# Patient Record
Sex: MALE | Race: BLACK OR AFRICAN AMERICAN | Employment: FULL TIME | URBAN - METROPOLITAN AREA
[De-identification: names, ages, dates, MRNs, and addresses within clinical notes are randomized per-mention and may not be internally consistent; named-entity substitution may affect disease eponyms.]

---

## 2018-10-30 ENCOUNTER — ED HISTORICAL/CONVERTED ENCOUNTER (OUTPATIENT)
Dept: OTHER | Age: 53
End: 2018-10-30

## 2020-11-16 ENCOUNTER — OFFICE VISIT (OUTPATIENT)
Dept: INTERNAL MEDICINE CLINIC | Age: 55
End: 2020-11-16
Payer: COMMERCIAL

## 2020-11-16 VITALS
TEMPERATURE: 98 F | DIASTOLIC BLOOD PRESSURE: 88 MMHG | SYSTOLIC BLOOD PRESSURE: 172 MMHG | RESPIRATION RATE: 18 BRPM | WEIGHT: 248.8 LBS | HEIGHT: 73 IN | HEART RATE: 84 BPM | BODY MASS INDEX: 32.97 KG/M2 | OXYGEN SATURATION: 98 %

## 2020-11-16 DIAGNOSIS — Z12.5 SCREENING FOR MALIGNANT NEOPLASM OF PROSTATE: ICD-10-CM

## 2020-11-16 DIAGNOSIS — N52.9 IMPOTENCE: ICD-10-CM

## 2020-11-16 DIAGNOSIS — I10 ESSENTIAL HYPERTENSION: Primary | ICD-10-CM

## 2020-11-16 PROBLEM — M77.10 LATERAL EPICONDYLITIS: Status: ACTIVE | Noted: 2020-11-16

## 2020-11-16 PROBLEM — M79.603 PAIN IN UPPER LIMB: Status: ACTIVE | Noted: 2020-11-16

## 2020-11-16 PROBLEM — I80.9 SUPERFICIAL THROMBOPHLEBITIS: Status: ACTIVE | Noted: 2020-11-16

## 2020-11-16 PROBLEM — J06.9 URI (UPPER RESPIRATORY INFECTION): Status: ACTIVE | Noted: 2020-11-16

## 2020-11-16 PROBLEM — R53.83 FATIGUE: Status: ACTIVE | Noted: 2020-11-16

## 2020-11-16 PROBLEM — N52.1 ERECTILE DYSFUNCTION DUE TO DISEASES CLASSIFIED ELSEWHERE: Status: ACTIVE | Noted: 2020-11-16

## 2020-11-16 PROBLEM — G47.00 INSOMNIA: Status: ACTIVE | Noted: 2020-11-16

## 2020-11-16 PROBLEM — M25.569 KNEE PAIN: Status: ACTIVE | Noted: 2020-11-16

## 2020-11-16 PROBLEM — I67.89 ACUTE ILL-DEFINED CEREBROVASCULAR DISEASE: Status: ACTIVE | Noted: 2020-11-16

## 2020-11-16 PROBLEM — R03.0 ELEVATED BLOOD-PRESSURE READING WITHOUT DIAGNOSIS OF HYPERTENSION: Status: ACTIVE | Noted: 2020-11-16

## 2020-11-16 PROBLEM — R05.9 COUGH: Status: ACTIVE | Noted: 2020-11-16

## 2020-11-16 PROCEDURE — 99214 OFFICE O/P EST MOD 30 MIN: CPT | Performed by: INTERNAL MEDICINE

## 2020-11-16 RX ORDER — AMLODIPINE BESYLATE 5 MG/1
1 TABLET ORAL DAILY
COMMUNITY
Start: 2020-10-17 | End: 2021-06-03 | Stop reason: SDUPTHER

## 2020-11-16 NOTE — PROGRESS NOTES
Kalia Villagomez is a 48 y.o. male and presents with Follow-up and Hypertension  Patient presents for follow-up we reviewed last labs of the year ago October 31 showing glucose 104 BUN 10 creatinine 1.45 potassium 4.4 sodium 142 normal hepatocellular enzymes white count 4600 hemoglobin 16.4 PSA 0.27 TSH 1.3-. 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 History of elevated blood pressure without the diagnosis of hypertension but today after resting and using a large cuff blood pressure 559 systolic we discussed diet exercise he was agreeable drawing blood for those studies below we discussed preventive care-he has some symptoms of rectal dysfunction and wanted testosterone checked checked           Review of Systems  Constitutional: negative for fevers, chills, anorexia, weight loss and fatigue,no insomnia  Eyes:   negative for visual disturbance and irritation, eye discharge, eye pain. no eye redness. ENT:   negative for tinnitus, sore throat, nasal congestion, ear pain, hoarseness, hearing loss.,no snoring. Respiratory:  negative for cough, hemoptysis, shortness of breath, wheezing,  CV:   negative for chest pain, palpitations, lower extremity edema, shortness of breath while sitting, walking or at night  GI:   negative for nausea, vomiting, diarrhea, abdominal pain,melena,constipation. Endo:               negative for polyuria, polydipsia, polyphagia, cold or heat intolerance,hair loss. Genitourinary: negative for frequency, dysuria and hematuria,urethral-erectile dysfunction history discharge,nocturia.straining while urination,urinary incontinence. Integument:  negative for rash and pruritus  Hematologic:  negative for easy bruising and gum/nose bleeding, enlarged nodes  Musculoskel: negative for myalgias, arthralgias, back pain, muscle weakness, joint pain, h/o fall,cramps,calf pain.   Neurological:  negative for headaches, dizziness, vertigo, memory problems, gait and seizures loss of consciousness,no ataxia. Behavl/Psych: negative for feelings of anxiety, depression, mood changes ,sadness    Past Medical History:   Diagnosis Date    Acute ill-defined cerebrovascular disease 11/16/2020    Cough 11/16/2020    Elevated blood-pressure reading without diagnosis of hypertension 11/16/2020    Erectile dysfunction due to diseases classified elsewhere 11/16/2020    Essential hypertension 11/16/2020    Fatigue 11/16/2020    Insomnia 11/16/2020    Knee pain 11/16/2020    Lateral epicondylitis 11/16/2020    Pain in upper limb 11/16/2020    Superficial thrombophlebitis 11/16/2020    URI (upper respiratory infection) 11/16/2020     No past surgical history on file. Social History     Socioeconomic History    Marital status:      Spouse name: Not on file    Number of children: Not on file    Years of education: Not on file    Highest education level: Not on file   Tobacco Use    Smoking status: Current Some Day Smoker     Years: 10.00     Types: Cigars    Smokeless tobacco: Never Used    Tobacco comment: 4 cigars a day   Substance and Sexual Activity    Alcohol use: Yes     Comment: occasional     Family History   Problem Relation Age of Onset    Elevated Lipids Mother     Hypertension Mother     Hypertension Father      Current Outpatient Medications   Medication Sig Dispense Refill    amLODIPine (NORVASC) 5 mg tablet Take 1 Tab by mouth daily. No Known Allergies    Objective:  Visit Vitals  BP (!) 172/88 (BP 1 Location: Right arm, BP Patient Position: Sitting)   Pulse 84   Temp 98 °F (36.7 °C) (Oral)   Resp 18   Ht 6' 1\" (1.854 m)   Wt 248 lb 12.8 oz (112.9 kg)   SpO2 98%   BMI 32.83 kg/m²       Physical Exam:   Constitutional: General Appearance: healthy-appearing and obese. Level of Distress: NAD. Ambulation: ambulating normally. Psychiatric: Mental Status: normal mood and affect and active and alert.  Orientation: to time, place, and person. no agitation. ,normal eye contact. normal insight  Head: Head: normocephalic and atraumatic. Eyes: Pupils: PERRLA. Sclerae: non-icteric. ENMT: No lesions on external ear, no hearing loss. No lesions on external nose, sinus tenderness, or nasal discharge. Lips, Teeth, and no mouth or lip ulcers   Neck: Neck: supple, trachea midline, and no masses. Lymph Nodes: no cervical LAD. Thyroid: no enlargement or nodules and non-tender. Lungs: Respiratory effort: no dyspnea. Auscultation: no wheezing, rales/crackles, or rhonchi and breath sounds normal and good air movement. Cardiovascular: Apical Impulse: not displaced. Heart Auscultation: normal S1 and S2; no murmurs, rubs, or gallops; and RRR. Neck vessels: no carotid bruits. Pulses including femoral / pedal: normal throughout. Abdomen: Bowel Sounds: normal. Inspection and Palpation: no tenderness, guarding, or masses and soft and non-distended. Liver: non-tender and no hepatomegaly. Spleen: non-tender and no splenomegaly.:  Musculoskeletal[de-identified] Extremities: no edema or varicosities. Calf tenderness. Neurologic: Gait and Station: normal gait and station. Motor Strength normal right and left. Sensory and cerebellar intact. Skin: Inspection and palpation: no rash, lesions, or ulcer. No results found for this or any previous visit. Assessment/Plan:    ICD-10-CM ICD-9-CM    1. Essential hypertension  I10 401.9 CBC WITH AUTOMATED DIFF      METABOLIC PANEL, COMPREHENSIVE      CHOLESTEROL, TOTAL      TSH 3RD GENERATION   2. Impotence  N52.9 607.84 TESTOSTERONE, FREE & TOTAL   3. Screening for malignant neoplasm of prostate  Z12.5 V76.44 PSA W/ REFLX FREE PSA     Orders Placed This Encounter    PSA W/ REFLX FREE PSA    CBC WITH AUTOMATED DIFF    METABOLIC PANEL, COMPREHENSIVE    CHOLESTEROL, TOTAL    TSH 3RD GENERATION    TESTOSTERONE, FREE & TOTAL    amLODIPine (NORVASC) 5 mg tablet     Sig: Take 1 Tab by mouth daily.        routine labs ordered, call if any problems    There are no Patient Instructions on file for this visit. Follow-up and Dispositions    · Return in about 6 months (around 5/16/2021).

## 2020-11-18 LAB
ALBUMIN SERPL-MCNC: 4.1 G/DL (ref 3.8–4.9)
ALBUMIN/GLOB SERPL: 1.3 {RATIO} (ref 1.2–2.2)
ALP SERPL-CCNC: 70 IU/L (ref 39–117)
ALT SERPL-CCNC: 25 IU/L (ref 0–44)
AST SERPL-CCNC: 19 IU/L (ref 0–40)
BASOPHILS # BLD AUTO: 0 X10E3/UL (ref 0–0.2)
BASOPHILS NFR BLD AUTO: 1 %
BILIRUB SERPL-MCNC: 0.9 MG/DL (ref 0–1.2)
BUN SERPL-MCNC: 17 MG/DL (ref 6–24)
BUN/CREAT SERPL: 12 (ref 9–20)
CALCIUM SERPL-MCNC: 9.1 MG/DL (ref 8.7–10.2)
CHLORIDE SERPL-SCNC: 102 MMOL/L (ref 96–106)
CHOLEST SERPL-MCNC: 216 MG/DL (ref 100–199)
CO2 SERPL-SCNC: 25 MMOL/L (ref 20–29)
CREAT SERPL-MCNC: 1.46 MG/DL (ref 0.76–1.27)
EOSINOPHIL # BLD AUTO: 0.1 X10E3/UL (ref 0–0.4)
EOSINOPHIL NFR BLD AUTO: 2 %
ERYTHROCYTE [DISTWIDTH] IN BLOOD BY AUTOMATED COUNT: 12.8 % (ref 11.6–15.4)
GLOBULIN SER CALC-MCNC: 3.1 G/DL (ref 1.5–4.5)
GLUCOSE SERPL-MCNC: 118 MG/DL (ref 65–99)
HCT VFR BLD AUTO: 48.9 % (ref 37.5–51)
HGB BLD-MCNC: 16.6 G/DL (ref 13–17.7)
IMM GRANULOCYTES # BLD AUTO: 0 X10E3/UL (ref 0–0.1)
IMM GRANULOCYTES NFR BLD AUTO: 0 %
LYMPHOCYTES # BLD AUTO: 1.6 X10E3/UL (ref 0.7–3.1)
LYMPHOCYTES NFR BLD AUTO: 27 %
MCH RBC QN AUTO: 30.8 PG (ref 26.6–33)
MCHC RBC AUTO-ENTMCNC: 33.9 G/DL (ref 31.5–35.7)
MCV RBC AUTO: 91 FL (ref 79–97)
MONOCYTES # BLD AUTO: 0.4 X10E3/UL (ref 0.1–0.9)
MONOCYTES NFR BLD AUTO: 7 %
NEUTROPHILS # BLD AUTO: 3.8 X10E3/UL (ref 1.4–7)
NEUTROPHILS NFR BLD AUTO: 63 %
PLATELET # BLD AUTO: 151 X10E3/UL (ref 150–450)
POTASSIUM SERPL-SCNC: 4.5 MMOL/L (ref 3.5–5.2)
PROT SERPL-MCNC: 7.2 G/DL (ref 6–8.5)
PSA SERPL-MCNC: 0.3 NG/ML (ref 0–4)
RBC # BLD AUTO: 5.39 X10E6/UL (ref 4.14–5.8)
REFLEX CRITERIA: NORMAL
SODIUM SERPL-SCNC: 140 MMOL/L (ref 134–144)
TESTOST FREE SERPL-MCNC: 8 PG/ML (ref 7.2–24)
TESTOST SERPL-MCNC: 319 NG/DL (ref 264–916)
TSH SERPL DL<=0.005 MIU/L-ACNC: 1.03 UIU/ML (ref 0.45–4.5)
WBC # BLD AUTO: 5.9 X10E3/UL (ref 3.4–10.8)

## 2020-12-02 ENCOUNTER — TELEPHONE (OUTPATIENT)
Dept: INTERNAL MEDICINE CLINIC | Age: 55
End: 2020-12-02

## 2020-12-02 RX ORDER — AMLODIPINE BESYLATE 5 MG/1
5 TABLET ORAL DAILY
Qty: 90 TAB | Refills: 1 | Status: SHIPPED | OUTPATIENT
Start: 2020-12-02 | End: 2021-05-31

## 2020-12-02 NOTE — TELEPHONE ENCOUNTER
Patient called looking for his Amodipine 5mg please send to [facility]. I looks like it was put in but not sent.

## 2021-04-26 ENCOUNTER — OFFICE VISIT (OUTPATIENT)
Dept: INTERNAL MEDICINE CLINIC | Age: 56
End: 2021-04-26
Payer: COMMERCIAL

## 2021-04-26 VITALS
WEIGHT: 256 LBS | HEIGHT: 74 IN | TEMPERATURE: 97.8 F | DIASTOLIC BLOOD PRESSURE: 72 MMHG | RESPIRATION RATE: 18 BRPM | OXYGEN SATURATION: 99 % | SYSTOLIC BLOOD PRESSURE: 132 MMHG | BODY MASS INDEX: 32.85 KG/M2 | HEART RATE: 86 BPM

## 2021-04-26 DIAGNOSIS — F51.01 PRIMARY INSOMNIA: ICD-10-CM

## 2021-04-26 DIAGNOSIS — R73.9 HYPERGLYCEMIA: ICD-10-CM

## 2021-04-26 DIAGNOSIS — I10 ESSENTIAL HYPERTENSION: Primary | ICD-10-CM

## 2021-04-26 DIAGNOSIS — E78.00 PURE HYPERCHOLESTEROLEMIA: ICD-10-CM

## 2021-04-26 PROBLEM — S49.91XA INJURY OF RIGHT SHOULDER AND UPPER ARM: Status: ACTIVE | Noted: 2021-04-26

## 2021-04-26 LAB — GLUCOSE POC: 99 MG/DL

## 2021-04-26 PROCEDURE — 82962 GLUCOSE BLOOD TEST: CPT | Performed by: INTERNAL MEDICINE

## 2021-04-26 PROCEDURE — 99214 OFFICE O/P EST MOD 30 MIN: CPT | Performed by: INTERNAL MEDICINE

## 2021-04-26 RX ORDER — TEMAZEPAM 30 MG/1
30 CAPSULE ORAL
Qty: 30 CAP | Refills: 0 | Status: SHIPPED | OUTPATIENT
Start: 2021-04-26 | End: 2021-05-26

## 2021-04-26 NOTE — PROGRESS NOTES
1. Have you been to the ER, urgent care clinic since your last visit? Hospitalized since your last visit? No    2. Have you seen or consulted any other health care providers outside of the [address] [place] [address] since your last visit? Include any pap smears or colon screening.  No     Chief Complaint   Patient presents with    Follow Up Chronic Condition    Hypertension     Visit Vitals  /72 (BP 1 Location: Left arm, BP Patient Position: Sitting, BP Cuff Size: Adult)   Pulse 86   Temp 97.8 °F (36.6 °C) (Oral)   Resp 18   Ht 6' 2\" (1.88 m)   Wt 256 lb (116.1 kg)   SpO2 99%   BMI 32.87 kg/m²

## 2021-04-26 NOTE — PROGRESS NOTES
Torin De Paz is a 55 y.o. male and presents with Follow Up Chronic Condition and Hypertension  Patient presents concerned about some bruising on the lateral aspect of the upper right dominant arm he says he was picking up some thing heavy and seem to jerk his arm up and few days later noticed a bruise and some soreness he had good capillary refill of the nailbeds on the right hand and normal Victor Hugo test no loss of strength good  good interosseous muscle strength and abduction of the arm at the shoulder he was concerned about the possibility of a stroke since it happened in the past but this seemed to be more musculoskeletal he had a bruising in the small area of the upper anterior arm which is more related to injury he says he is putting an over-the-counter topical salve on it is is better he did not want imaging studies or prescription medication he waited out another few days a week and let me know if he is continues to slowly improve he has an appointment next month nonetheless and will keep that we discussed his last labs showed a glucose of 118 creatinine 1.46 but he has quite a muscular yaneth and does exercise often potassium 4.5 calcium 9.1 SGOT of 19 cholesterol 216 TSH 1.03 testosterone 319 we discussed low testosterone risks and benefits of therapy use of phosphodiesterase inhibitors he wanted to hold off on testosterone replacement or recheck of testosterone blood pressure resting 128/86 left arm large cuff he wanted refill of his Restoril as it helps some insomnia takes it only on occasion blood sugar today 99 he has received the [place] vaccine x2 and we discussed coronavirus pandemic evaluation treatment and vaccine           Review of Systems  Constitutional: negative for fevers, chills, anorexia, weight loss and fatigue,no insomnia  Eyes:   negative for visual disturbance and irritation, eye discharge, eye pain. no eye redness.   ENT:   negative for tinnitus, sore throat, nasal congestion, ear pain, hoarseness, hearing loss.,no snoring. Respiratory:  negative for cough, hemoptysis, shortness of breath, wheezing,  CV:   negative for chest pain, palpitations, lower extremity edema, shortness of breath while sitting, walking or at night  GI:   negative for nausea, vomiting, diarrhea, abdominal pain,melena,constipation. Endo:               negative for polyuria, polydipsia, polyphagia, cold or heat intolerance,hair loss. Genitourinary: negative for frequency, dysuria and hematuria,urethral discharge,nocturia.straining while urination,urinary incontinence. Integument:  negative for rash and pruritus  Hematologic:  negative for easy bruising and gum/nose bleeding, enlarged nodes  Musculoskel: negative for myalgias, arthralgias, back pain, muscle weakness, joint pain, h/o fall,cramps,calf pain. Neurological:  negative for headaches, dizziness, vertigo, memory problems, gait and seizures loss of consciousness,no ataxia. Behavl/Psych: negative for feelings of anxiety, depression, mood changes ,sadness    Past Medical History:   Diagnosis Date    Acute ill-defined cerebrovascular disease 11/16/2020    Cough 11/16/2020    Elevated blood-pressure reading without diagnosis of hypertension 11/16/2020    Erectile dysfunction due to diseases classified elsewhere 11/16/2020    Essential hypertension 11/16/2020    Fatigue 11/16/2020    Insomnia 11/16/2020    Knee pain 11/16/2020    Lateral epicondylitis 11/16/2020    Pain in upper limb 11/16/2020    Superficial thrombophlebitis 11/16/2020    URI (upper respiratory infection) 11/16/2020     No past surgical history on file.   Social History     Socioeconomic History    Marital status:      Spouse name: Not on file    Number of children: Not on file    Years of education: Not on file    Highest education level: Not on file   Tobacco Use    Smoking status: Current Some Day Smoker     Packs/day: 0.00     Years: 10.00     Pack years: 0.00     Types: Cigars    Smokeless tobacco: Never Used    Tobacco comment: 4 cigars a day   Substance and Sexual Activity    Alcohol use: Yes     Comment: occasional     Family History   Problem Relation Age of Onset    Elevated Lipids Mother     Hypertension Mother     Hypertension Father      Current Outpatient Medications   Medication Sig Dispense Refill    amLODIPine (NORVASC) 5 mg tablet Take 1 Tab by mouth daily for 180 days. 90 Tab 1    amLODIPine (NORVASC) 5 mg tablet Take 1 Tab by mouth daily. No Known Allergies    Objective:  Visit Vitals  /72 (BP 1 Location: Left arm, BP Patient Position: Sitting, BP Cuff Size: Adult)   Pulse 86   Temp 97.8 °F (36.6 °C) (Oral)   Resp 18   Ht 6' 2\" (1.88 m)   Wt 256 lb (116.1 kg)   SpO2 99%   BMI 32.87 kg/m²       Physical Exam:   Constitutional: General Appearance: healthy-appearing and obese. Level of Distress: NAD. Ambulation: ambulating normally. Psychiatric: Mental Status: normal mood and affect and active and alert. Orientation: to time, place, and person. no agitation. ,normal eye contact. normal insight  Head: Head: normocephalic and atraumatic. Eyes: Pupils: PERRLA. Sclerae: non-icteric. ENMT: No lesions on external ear, no hearing loss. No lesions on external nose, sinus tenderness, or nasal discharge. Lips, Teeth, and no mouth or lip ulcers   Neck: Neck: supple, trachea midline, and no masses. Lymph Nodes: no cervical LAD. Thyroid: no enlargement or nodules and non-tender. Lungs: Respiratory effort: no dyspnea. Auscultation: no wheezing, rales/crackles, or rhonchi and breath sounds normal and good air movement. Cardiovascular: Apical Impulse: not displaced. Heart Auscultation: normal S1 and S2; no murmurs, rubs, or gallops; and RRR. Neck vessels: no carotid bruits. Pulses including femoral / pedal: normal throughout. Abdomen: Bowel Sounds: normal. Inspection and Palpation: no tenderness, guarding, or masses and soft and non-distended.  Liver: non-tender  Musculoskeletal[de-identified] Extremities: no edema or varicosities. Calf tenderness. Muscular well-developed  Neurologic: Gait and Station: normal gait and station. Motor Strength normal right and left. Sensory and cerebellar intact. Upper right arm  Skin: Inspection and palpation: no rash, lesions, or ulcer. Bruise level      Results for orders placed or performed in visit on 04/26/21   AMB POC GLUCOSE BLOOD, BY GLUCOSE MONITORING DEVICE   Result Value Ref Range    Glucose POC 99 MG/DL       Assessment/Plan:    ICD-10-CM ICD-9-CM    1. Essential hypertension  I10 401.9    2. Hyperglycemia  R73.9 790.29 AMB POC GLUCOSE BLOOD, BY GLUCOSE MONITORING DEVICE   3. Pure hypercholesterolemia  E78.00 272.0      Orders Placed This Encounter    AMB POC GLUCOSE BLOOD, BY GLUCOSE MONITORING DEVICE       call if any problems    There are no Patient Instructions on file for this visit. Follow-up and Dispositions    · Return in about 4 weeks (around 5/24/2021).

## 2021-06-02 ENCOUNTER — OFFICE VISIT (OUTPATIENT)
Dept: INTERNAL MEDICINE CLINIC | Age: 56
End: 2021-06-02
Payer: COMMERCIAL

## 2021-06-02 VITALS
HEIGHT: 74 IN | TEMPERATURE: 98.1 F | WEIGHT: 256.4 LBS | SYSTOLIC BLOOD PRESSURE: 142 MMHG | HEART RATE: 86 BPM | DIASTOLIC BLOOD PRESSURE: 72 MMHG | OXYGEN SATURATION: 99 % | BODY MASS INDEX: 32.91 KG/M2 | RESPIRATION RATE: 18 BRPM

## 2021-06-02 DIAGNOSIS — R79.89 ELEVATED SERUM CREATININE: ICD-10-CM

## 2021-06-02 DIAGNOSIS — E78.00 PURE HYPERCHOLESTEROLEMIA: ICD-10-CM

## 2021-06-02 DIAGNOSIS — R53.83 FATIGUE, UNSPECIFIED TYPE: Primary | ICD-10-CM

## 2021-06-02 DIAGNOSIS — R73.9 HYPERGLYCEMIA: ICD-10-CM

## 2021-06-02 PROCEDURE — 99214 OFFICE O/P EST MOD 30 MIN: CPT | Performed by: INTERNAL MEDICINE

## 2021-06-02 NOTE — PROGRESS NOTES
Isidro Marley is a 55 y.o. male and presents with Follow-up   Patient presents for follow-up recently seen for ecchymosis bruise some discomfort lateral dominant upper right arm this has resolved blood pressure today 128/78 left arm large cuff resting he was agreeable for the office phlebotomist draw blood flow studies below and we discussed his last labs November of last year yielding an AST of 19 testosterone low normal 319 cholesterol 216 TSH 1.0 creatinine 1.46 and we discussed renal impairment azotemia avoidance of nonsteroidals trying to maintain hydration. We will check her creatinine today we advised to make sure he monitors blood pressure and keep that down as well glucose was 118 BUN 17 white count 5900 hemoglobin 16.6 November of last year potassium 4.5 sodium 140-the patient is quite muscular works out and perhaps the elevated creatinine is somewhat due to this as well         Review of Systems  Constitutional: negative for fevers, chills, anorexia, weight loss and occasional fatigue,no insomnia  Eyes:   negative for visual disturbance and irritation, eye discharge, eye pain. no eye redness. ENT:   negative for tinnitus, sore throat, nasal congestion, ear pain, hoarseness, hearing loss.,no snoring. Respiratory:  negative for cough, hemoptysis, shortness of breath, wheezing,  CV:   negative for chest pain, palpitations, lower extremity edema, shortness of breath while sitting, walking or at night  GI:   negative for nausea, vomiting, diarrhea, abdominal pain,melena,constipation. Endo:               negative for polyuria, polydipsia, polyphagia, cold or heat intolerance,hair loss. Genitourinary: negative for frequency, dysuria and hematuria,urethral discharge,nocturia.straining while urination,urinary incontinence.   Integument:  negative for rash and pruritus  Hematologic:  negative for easy bruising and gum/nose bleeding, enlarged nodes  Musculoskel: negative for myalgias, arthralgias, back pain, muscle weakness, joint pain, h/o fall,cramps,calf pain. Neurological:  negative for headaches, dizziness, vertigo, memory problems, gait and seizures loss of consciousness,no ataxia. Behavl/Psych: negative for feelings of anxiety, depression, mood changes ,sadness    Past Medical History:   Diagnosis Date    Acute ill-defined cerebrovascular disease 11/16/2020    Cough 11/16/2020    Elevated blood-pressure reading without diagnosis of hypertension 11/16/2020    Erectile dysfunction due to diseases classified elsewhere 11/16/2020    Essential hypertension 11/16/2020    Fatigue 11/16/2020    Insomnia 11/16/2020    Knee pain 11/16/2020    Lateral epicondylitis 11/16/2020    Pain in upper limb 11/16/2020    Superficial thrombophlebitis 11/16/2020    URI (upper respiratory infection) 11/16/2020     History reviewed. No pertinent surgical history. Social History     Socioeconomic History    Marital status:      Spouse name: Not on file    Number of children: Not on file    Years of education: Not on file    Highest education level: Not on file   Tobacco Use    Smoking status: Current Some Day Smoker     Packs/day: 0.00     Years: 10.00     Pack years: 0.00     Types: Cigars    Smokeless tobacco: Never Used    Tobacco comment: 4 cigars a day   Substance and Sexual Activity    Alcohol use: Yes     Comment: occasional     Social Determinants of Health     Financial Resource Strain:     Difficulty of Paying Living Expenses:    Food Insecurity:     Worried About Running Out of Food in the Last Year:     Ran Out of Food in the Last Year:    Transportation Needs:     Lack of Transportation (Medical):      Lack of Transportation (Non-Medical):    Physical Activity:     Days of Exercise per Week:     Minutes of Exercise per Session:    Stress:     Feeling of Stress :    Social Connections:     Frequency of Communication with Friends and Family:     Frequency of Social Gatherings with Friends and Family:     Attends [employer] Services:     Active Member of Clubs or Organizations:     Attends Club or Organization Meetings:     Marital Status:      Family History   Problem Relation Age of Onset    Elevated Lipids Mother     Hypertension Mother     Hypertension Father      Current Outpatient Medications   Medication Sig Dispense Refill    amLODIPine (NORVASC) 5 mg tablet Take 1 Tab by mouth daily. No Known Allergies    Objective:  Visit Vitals  BP (!) 142/72 (BP 1 Location: Left arm, BP Patient Position: Sitting, BP Cuff Size: Adult)   Pulse 86   Temp 98.1 °F (36.7 °C) (Oral)   Resp 18   Ht 6' 2\" (1.88 m)   Wt 256 lb 6.4 oz (116.3 kg)   SpO2 99%   BMI 32.92 kg/m²       Physical Exam:   Constitutional: General Appearance: healthy-appearing and obese. Level of Distress: NAD. Ambulation: ambulating normally. Psychiatric: Mental Status: normal mood and affect and active and alert. Orientation: to time, place, and person. no agitation. ,normal eye contact. normal insight  Head: Head: normocephalic and atraumatic. Eyes: Pupils: PERRLA. Sclerae: non-icteric. ENMT: No lesions on external ear, no hearing loss. No lesions on external nose, sinus tenderness, or nasal discharge. Lips, Teeth, and no mouth or lip ulcers   Neck: Neck: supple, trachea midline, and no masses. Lymph Nodes: no cervical LAD. Thyroid: no enlargement or nodules and non-tender. Lungs: Respiratory effort: no dyspnea. Auscultation: no wheezing, rales/crackles, or rhonchi and breath sounds normal and good air movement. Cardiovascular: Apical Impulse: not displaced. Heart Auscultation: normal S1 and S2; no murmurs, rubs, or gallops; and RRR. Neck vessels: no carotid bruits. Pulses including femoral / pedal: normal throughout. Abdomen: Bowel Sounds: normal. Inspection and Palpation: no tenderness, guarding, or masses and soft and non-distended. Liver: non-tender   Musculoskeletal[de-identified] Extremities: no edema or varicosities.  Calf tenderness. Neurologic: Gait and Station: normal gait and station. Motor Strength normal right and left. Sensory and cerebellar intact. Skin: Inspection and palpation: no rash, lesions, or ulcer. Results for orders placed or performed in visit on 04/26/21   AMB POC GLUCOSE BLOOD, BY GLUCOSE MONITORING DEVICE   Result Value Ref Range    Glucose POC 99 MG/DL       Assessment/Plan:    ICD-10-CM ICD-9-CM    1. Fatigue, unspecified type  R53.83 780.79 CBC WITH AUTOMATED DIFF      METABOLIC PANEL, COMPREHENSIVE      TSH 3RD GENERATION   2. Pure hypercholesterolemia  E78.00 272.0 LIPID PANEL   3. Hyperglycemia  R73.9 790.29 HEMOGLOBIN A1C WITH EAG   4. Elevated serum creatinine  R79.89 790.99      Orders Placed This Encounter    CBC WITH AUTOMATED DIFF    METABOLIC PANEL, COMPREHENSIVE    TSH 3RD GENERATION    LIPID PANEL    HEMOGLOBIN A1C WITH EAG       routine labs ordered, call if any problems    There are no Patient Instructions on file for this visit. Follow-up and Dispositions    · Return in about 6 months (around 12/2/2021).

## 2021-06-02 NOTE — PROGRESS NOTES
1. Have you been to the ER, urgent care clinic since your last visit? Hospitalized since your last visit? No    2. Have you seen or consulted any other health care providers outside of the [address] [place] [address] since your last visit? Include any pap smears or colon screening. No     Chief Complaint   Patient presents with    Follow-up     Visit Vitals  BP (!) 142/72 (BP 1 Location: Left arm, BP Patient Position: Sitting, BP Cuff Size: Adult)   Pulse 86   Temp 98.1 °F (36.7 °C) (Oral)   Resp 18   Ht 6' 2\" (1.88 m)   Wt 256 lb 6.4 oz (116.3 kg)   SpO2 99%   BMI 32.92 kg/m²       Dr. Madeline Post made aware of elevated BP.

## 2021-06-03 RX ORDER — AMLODIPINE BESYLATE 5 MG/1
5 TABLET ORAL DAILY
Qty: 90 TABLET | Refills: 1 | Status: SHIPPED | OUTPATIENT
Start: 2021-06-03 | End: 2021-11-30

## 2021-06-04 LAB
ALBUMIN SERPL-MCNC: 4.1 G/DL (ref 3.8–4.9)
ALBUMIN/GLOB SERPL: 1.6 {RATIO} (ref 1.2–2.2)
ALP SERPL-CCNC: 62 IU/L (ref 48–121)
ALT SERPL-CCNC: 36 IU/L (ref 0–44)
AST SERPL-CCNC: 28 IU/L (ref 0–40)
BASOPHILS # BLD AUTO: 0 X10E3/UL (ref 0–0.2)
BASOPHILS NFR BLD AUTO: 1 %
BILIRUB SERPL-MCNC: 0.7 MG/DL (ref 0–1.2)
BUN SERPL-MCNC: 17 MG/DL (ref 6–24)
BUN/CREAT SERPL: 13 (ref 9–20)
CALCIUM SERPL-MCNC: 9.3 MG/DL (ref 8.7–10.2)
CHLORIDE SERPL-SCNC: 103 MMOL/L (ref 96–106)
CHOLEST SERPL-MCNC: 225 MG/DL (ref 100–199)
CO2 SERPL-SCNC: 20 MMOL/L (ref 20–29)
CREAT SERPL-MCNC: 1.34 MG/DL (ref 0.76–1.27)
EOSINOPHIL # BLD AUTO: 0.2 X10E3/UL (ref 0–0.4)
EOSINOPHIL NFR BLD AUTO: 3 %
ERYTHROCYTE [DISTWIDTH] IN BLOOD BY AUTOMATED COUNT: 13.3 % (ref 11.6–15.4)
EST. AVERAGE GLUCOSE BLD GHB EST-MCNC: 128 MG/DL
GLOBULIN SER CALC-MCNC: 2.6 G/DL (ref 1.5–4.5)
GLUCOSE SERPL-MCNC: 125 MG/DL (ref 65–99)
HBA1C MFR BLD: 6.1 % (ref 4.8–5.6)
HCT VFR BLD AUTO: 48 % (ref 37.5–51)
HDLC SERPL-MCNC: 46 MG/DL
HGB BLD-MCNC: 16.6 G/DL (ref 13–17.7)
IMM GRANULOCYTES # BLD AUTO: 0 X10E3/UL (ref 0–0.1)
IMM GRANULOCYTES NFR BLD AUTO: 0 %
LDLC SERPL CALC-MCNC: 159 MG/DL (ref 0–99)
LYMPHOCYTES # BLD AUTO: 1.6 X10E3/UL (ref 0.7–3.1)
LYMPHOCYTES NFR BLD AUTO: 30 %
MCH RBC QN AUTO: 31.6 PG (ref 26.6–33)
MCHC RBC AUTO-ENTMCNC: 34.6 G/DL (ref 31.5–35.7)
MCV RBC AUTO: 91 FL (ref 79–97)
MONOCYTES # BLD AUTO: 0.4 X10E3/UL (ref 0.1–0.9)
MONOCYTES NFR BLD AUTO: 7 %
NEUTROPHILS # BLD AUTO: 3.1 X10E3/UL (ref 1.4–7)
NEUTROPHILS NFR BLD AUTO: 59 %
PLATELET # BLD AUTO: 134 X10E3/UL (ref 150–450)
POTASSIUM SERPL-SCNC: 4.4 MMOL/L (ref 3.5–5.2)
PROT SERPL-MCNC: 6.7 G/DL (ref 6–8.5)
RBC # BLD AUTO: 5.25 X10E6/UL (ref 4.14–5.8)
SODIUM SERPL-SCNC: 140 MMOL/L (ref 134–144)
TRIGL SERPL-MCNC: 109 MG/DL (ref 0–149)
TSH SERPL DL<=0.005 MIU/L-ACNC: 2.93 UIU/ML (ref 0.45–4.5)
VLDLC SERPL CALC-MCNC: 20 MG/DL (ref 5–40)
WBC # BLD AUTO: 5.2 X10E3/UL (ref 3.4–10.8)

## 2021-06-05 NOTE — PROGRESS NOTES
Notify the patient labs okay but blood sugar borderline up close to 130 hemoglobin A1c 6.1 indicating glucose intolerance but not diabetic he should probably watch and limit concentrated sweets and sugars will recheck on return cholesterol borderline as well but I do not think he needs a statin drug at this time

## 2021-08-04 ENCOUNTER — OFFICE VISIT (OUTPATIENT)
Dept: INTERNAL MEDICINE CLINIC | Age: 56
End: 2021-08-04
Payer: COMMERCIAL

## 2021-08-04 VITALS
HEIGHT: 73 IN | BODY MASS INDEX: 33.98 KG/M2 | DIASTOLIC BLOOD PRESSURE: 78 MMHG | SYSTOLIC BLOOD PRESSURE: 122 MMHG | HEART RATE: 80 BPM | WEIGHT: 256.4 LBS | TEMPERATURE: 97.4 F | RESPIRATION RATE: 18 BRPM | OXYGEN SATURATION: 98 %

## 2021-08-04 DIAGNOSIS — F17.290 CIGAR SMOKER: ICD-10-CM

## 2021-08-04 DIAGNOSIS — M79.642 LEFT HAND PAIN: Primary | ICD-10-CM

## 2021-08-04 PROCEDURE — 99203 OFFICE O/P NEW LOW 30 MIN: CPT | Performed by: NURSE PRACTITIONER

## 2021-08-04 NOTE — PROGRESS NOTES
Subjective  Chief Complaint   Patient presents with    Numbness     left thumb index, middle fingers     HPI:  Jama Claude is a 55 y.o. male. C/c left hand pain in the fingertips of 1,2,3. 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 He notes no precipitating factors, first observed approx 2 month, no trauma or injury. No temporal association. Intermittent tingling and coldness and occ sharp pain in the back of his hand between 2 and 3rd fingers, and he notes some swelling  sometimes at rest and sometimes with movement, last a few seconds and resolve without intervention, occurring 3-4x a day. No attempted treatments, no OTC meds. Nothing relieve, nothing exacerbates. He smokes 3 cigars a day, mostly when he is bored. Pain doesn't radiate. Denies numbness, fever, redness, generalized swelling. No seasonality, hand pain not associated with weather or cold. He is former high school football player, notes he broke the small finger of the left hand. Objective  Visit Vitals  /78 (BP 1 Location: Left upper arm, BP Patient Position: Sitting)   Pulse 80   Temp 97.4 °F (36.3 °C) (Oral)   Resp 18   Ht 6' 1\" (1.854 m)   Wt 256 lb 6.4 oz (116.3 kg)   SpO2 98%   BMI 33.83 kg/m²     Physical Exam  Constitutional:       Appearance: Normal appearance. He is normal weight. HENT:      Head: Normocephalic and atraumatic. Eyes:      Extraocular Movements: Extraocular movements intact. Pupils: Pupils are equal, round, and reactive to light. Pulmonary:      Effort: Pulmonary effort is normal.   Musculoskeletal:         General: No swelling, tenderness, deformity or signs of injury. Normal range of motion. Cervical back: Normal range of motion. Comments: Negative phanel and tinels signs   Skin:     Capillary Refill: Capillary refill takes less than 2 seconds. Neurological:      General: No focal deficit present.       Mental Status: He is alert and oriented to person, place, and time. Cranial Nerves: Cranial nerves are intact. No cranial nerve deficit or facial asymmetry. Sensory: Sensation is intact. Motor: Motor function is intact. No weakness, tremor or pronator drift. Coordination: Coordination is intact. Romberg sign negative. Coordination normal. Finger-Nose-Finger Test and Heel to [facility] Test normal. Rapid alternating movements normal.      Gait: Gait is intact. Gait and tandem walk normal.      Deep Tendon Reflexes: Reflexes normal.      Reflex Scores:       Patellar reflexes are 2+ on the right side and 2+ on the left side. Psychiatric:         Mood and Affect: Mood normal.         Behavior: Behavior normal.          Assessment & Plan      ICD-10-CM ICD-9-CM    1. Left hand pain  M79.642 729.5 XR HAND LT MIN 3 V   2. Cigar smoker  F17.290 305.1      Diagnoses and all orders for this visit:    1. Left hand pain  -     XR HAND LT MIN 3 V; Future  I suspect numbness is from repetitive use and road vibrations from pts occupation as , and possibly injury from high school. Negative indications for carpal tunnel, reynauds phenomenon (CCB would also be useful in treating this). Encouraged him to  wrist splint at nearest retailer for use with driving, trial use of nsaid, d/c use of nicotine due to vasoconstrictive effects. If no improvement consider duplex ultrasound and or emg.   2. Cigar smoker  Declines NRT states can d/c without pharmacological assistance. Follow-up and Dispositions    · Return in about 3 months (around 11/4/2021).        Markel Oden, NP

## 2021-08-04 NOTE — PROGRESS NOTES
1. Have you been to the ER, urgent care clinic since your last visit? Hospitalized since your last visit? No    2. Have you seen or consulted any other health care providers outside of the [address] [place] [address] since your last visit? Include any pap smears or colon screening. No     Chief Complaint   Patient presents with    Numbness     left 3rd,4th,and 5th fingers      Pt states that he is having numbness and tingling in his left 3rd,4th,and 5th fingers.

## 2021-08-04 NOTE — PATIENT INSTRUCTIONS

## 2021-12-02 ENCOUNTER — TELEPHONE (OUTPATIENT)
Dept: INTERNAL MEDICINE CLINIC | Age: 56
End: 2021-12-02

## 2021-12-02 RX ORDER — AMLODIPINE BESYLATE 5 MG/1
5 TABLET ORAL DAILY
Qty: 90 TABLET | Refills: 0 | Status: SHIPPED | OUTPATIENT
Start: 2021-12-02 | End: 2022-03-02

## 2021-12-02 NOTE — TELEPHONE ENCOUNTER
Patient called requesting a refill for the following medication:    amLODIPine (NORVASC) 5 mg tablet 90 Tab 1 12/2/2020 5/31/2021    Sig - Route:  Take 1 Tab by mouth daily for 180 days. - Oral

## 2021-12-06 ENCOUNTER — OFFICE VISIT (OUTPATIENT)
Dept: INTERNAL MEDICINE CLINIC | Age: 56
End: 2021-12-06
Payer: COMMERCIAL

## 2021-12-06 VITALS
SYSTOLIC BLOOD PRESSURE: 138 MMHG | BODY MASS INDEX: 33.93 KG/M2 | WEIGHT: 256 LBS | RESPIRATION RATE: 14 BRPM | HEART RATE: 85 BPM | OXYGEN SATURATION: 98 % | HEIGHT: 73 IN | DIASTOLIC BLOOD PRESSURE: 78 MMHG

## 2021-12-06 DIAGNOSIS — G62.9 SENSORY NEUROPATHY: ICD-10-CM

## 2021-12-06 DIAGNOSIS — N40.0 BENIGN PROSTATIC HYPERPLASIA, UNSPECIFIED WHETHER LOWER URINARY TRACT SYMPTOMS PRESENT: ICD-10-CM

## 2021-12-06 DIAGNOSIS — R73.9 HYPERGLYCEMIA: Primary | ICD-10-CM

## 2021-12-06 DIAGNOSIS — F51.01 PRIMARY INSOMNIA: ICD-10-CM

## 2021-12-06 DIAGNOSIS — I10 ESSENTIAL HYPERTENSION: ICD-10-CM

## 2021-12-06 PROCEDURE — 99214 OFFICE O/P EST MOD 30 MIN: CPT | Performed by: INTERNAL MEDICINE

## 2021-12-06 RX ORDER — AMLODIPINE BESYLATE 5 MG/1
5 TABLET ORAL DAILY
Qty: 30 TABLET | Refills: 5 | Status: SHIPPED | OUTPATIENT
Start: 2021-12-06 | End: 2022-03-03 | Stop reason: SDUPTHER

## 2021-12-06 NOTE — PROGRESS NOTES
Chief Complaint   Patient presents with    Follow-up     6 month      Visit Vitals  /78 (BP 1 Location: Left upper arm, BP Patient Position: Sitting, BP Cuff Size: Adult)   Pulse 85   Resp 14   Ht 6' 1\" (1.854 m)   Wt 256 lb (116.1 kg)   SpO2 98%   BMI 33.78 kg/m²       1. Have you been to the ER, urgent care clinic since your last visit? Hospitalized since your last visit? No    2. Have you seen or consulted any other health care providers outside of the [address] [place] [address] since your last visit? Include any pap smears or colon screening.  No

## 2021-12-06 NOTE — PROGRESS NOTES
Kelly Mcneal is a 55 y.o. male and presents with Follow-up (6 month )  Patient presents for follow-up blood pressure 120/80 left arm. He has received the [place] vaccine x2. He was agreeable for the office phlebotomist to draw blood for those studies below-he did have a history of some slight elevation of blood sugar and this was checked again today hemoglobin A1c last June was 6.1 we will try to watch his diet he does exercise he is employed at [employer] as a  he had some problems with some paresthesia or tingling of the fingers and left nondominant hand but he holds the steering wheel tightly all day and the symptoms were referable to possible sensory neuropathy from perhaps ulnar nerve but that did not seem to be clearly carpal tunnel he says however it is much better and he wants to wait it out a while           Review of Systems  Constitutional: negative for fevers, chills, anorexia, weight loss and fatigue, noted insomnia  Eyes:   negative for visual disturbance and irritation, eye discharge, eye pain. no eye redness. ENT:   negative for tinnitus, sore throat, nasal congestion, ear pain, hoarseness, hearing loss.,no snoring. Respiratory:  negative for cough, hemoptysis, shortness of breath, wheezing,  CV:   negative for chest pain, palpitations, lower extremity edema, shortness of breath while sitting, walking or at night  GI:   negative for nausea, vomiting, diarrhea, abdominal pain,melena,constipation. Endo:               negative for polyuria, polydipsia, polyphagia, cold or heat intolerance,hair loss. Genitourinary: negative for frequency, dysuria and hematuria,urethral discharge,nocturia.straining while urination,urinary incontinence.   BPH symptoms  Integument:  negative for rash and pruritus  Hematologic:  negative for easy bruising and gum/nose bleeding, enlarged nodes  Musculoskel: negative for myalgias, arthralgias, back pain, muscle weakness, joint pain, h/o fall,cramps,calf pain.  Neurological:  negative for headaches, dizziness, vertigo, memory problems, gait and seizures loss of consciousness,no ataxia. Sensory neuropathy as above  Behavl/Psych: negative for feelings of anxiety, depression, mood changes ,sadness    Past Medical History:   Diagnosis Date    Acute ill-defined cerebrovascular disease 11/16/2020    Cough 11/16/2020    Elevated blood-pressure reading without diagnosis of hypertension 11/16/2020    Erectile dysfunction due to diseases classified elsewhere 11/16/2020    Essential hypertension 11/16/2020    Fatigue 11/16/2020    Insomnia 11/16/2020    Knee pain 11/16/2020    Lateral epicondylitis 11/16/2020    Pain in upper limb 11/16/2020    Superficial thrombophlebitis 11/16/2020    URI (upper respiratory infection) 11/16/2020     No past surgical history on file. Social History     Socioeconomic History    Marital status:    Tobacco Use    Smoking status: Current Some Day Smoker     Packs/day: 0.00     Years: 10.00     Pack years: 0.00     Types: Cigars    Smokeless tobacco: Never Used    Tobacco comment: 4 cigars a day   Vaping Use    Vaping Use: Never used   Substance and Sexual Activity    Alcohol use: Yes     Comment: occasional     Family History   Problem Relation Age of Onset    Elevated Lipids Mother     Hypertension Mother     Hypertension Father      Current Outpatient Medications   Medication Sig Dispense Refill    amLODIPine (NORVASC) 5 mg tablet Take 1 Tablet by mouth daily for 180 days. 30 Tablet 5    amLODIPine (NORVASC) 5 mg tablet Take 1 Tablet by mouth daily for 90 days. 90 Tablet 0     No Known Allergies    Objective:  Visit Vitals  /78 (BP 1 Location: Left upper arm, BP Patient Position: Sitting, BP Cuff Size: Adult)   Pulse 85   Resp 14   Ht 6' 1\" (1.854 m)   Wt 256 lb (116.1 kg)   SpO2 98%   BMI 33.78 kg/m²       Physical Exam:   Constitutional: General Appearance: healthy-appearing and obese.  Level of Distress: NAD. Ambulation: ambulating normally. Psychiatric: Mental Status: normal mood and affect and active and alert. Orientation: to time, place, and person. no agitation. ,normal eye contact. normal insight  Head: Head: normocephalic and atraumatic. Eyes: Pupils: PERRLA. Sclerae: non-icteric. ENMT: No lesions on external ear, no hearing loss. No lesions on external nose, sinus tenderness, or nasal discharge. Lips, Teeth, and no mouth or lip ulcers   Neck: Neck: supple, trachea midline, and no masses. Lymph Nodes: no cervical LAD. Thyroid: no enlargement or nodules and non-tender. Lungs: Respiratory effort: no dyspnea. Auscultation: no wheezing, rales/crackles, or rhonchi and breath sounds normal and good air movement. Cardiovascular: Apical Impulse: not displaced. Heart Auscultation: normal S1 and S2; no murmurs, rubs, or gallops; and RRR. Neck vessels: no carotid bruits. Pulses including femoral / pedal: normal throughout. Abdomen: Bowel Sounds: normal. Inspection and Palpation: no tenderness, guarding, or masses and soft and non-distended. Liver: non-tender and no hepatomegaly. Musculoskeletal[de-identified] Extremities: no edema or varicosities. Calf tenderness. DJD changes neurologic: Gait and Station: normal gait and station. Motor Strength normal right and left. Sensory and cerebellar intact. Skin: Inspection and palpation: no rash, lesions, or ulcer. Results for orders placed or performed in visit on 06/02/21   CBC WITH AUTOMATED DIFF   Result Value Ref Range    WBC 5.2 3.4 - 10.8 x10E3/uL    RBC 5.25 4.14 - 5.80 x10E6/uL    HGB 16.6 13.0 - 17.7 g/dL    HCT 48.0 37.5 - 51.0 %    MCV 91 79 - 97 fL    MCH 31.6 26.6 - 33.0 pg    MCHC 34.6 31.5 - 35.7 g/dL    RDW 13.3 11.6 - 15.4 %    PLATELET 465 (L) 083 - 450 x10E3/uL    NEUTROPHILS 59 Not Estab. %    Lymphocytes 30 Not Estab. %    MONOCYTES 7 Not Estab. %    EOSINOPHILS 3 Not Estab. %    BASOPHILS 1 Not Estab. %    ABS.  NEUTROPHILS 3.1 1.4 - 7.0 x10E3/uL Abs Lymphocytes 1.6 0.7 - 3.1 x10E3/uL    ABS. MONOCYTES 0.4 0.1 - 0.9 x10E3/uL    ABS. EOSINOPHILS 0.2 0.0 - 0.4 x10E3/uL    ABS. BASOPHILS 0.0 0.0 - 0.2 x10E3/uL    IMMATURE GRANULOCYTES 0 Not Estab. %    ABS. IMM. GRANS. 0.0 0.0 - 0.1 I77V0/EI   METABOLIC PANEL, COMPREHENSIVE   Result Value Ref Range    Glucose 125 (H) 65 - 99 mg/dL    BUN 17 6 - 24 mg/dL    Creatinine 1.34 (H) 0.76 - 1.27 mg/dL    GFR est non-AA 59 (L) >59 mL/min/1.73    GFR est AA 68 >59 mL/min/1.73    BUN/Creatinine ratio 13 9 - 20    Sodium 140 134 - 144 mmol/L    Potassium 4.4 3.5 - 5.2 mmol/L    Chloride 103 96 - 106 mmol/L    CO2 20 20 - 29 mmol/L    Calcium 9.3 8.7 - 10.2 mg/dL    Protein, total 6.7 6.0 - 8.5 g/dL    Albumin 4.1 3.8 - 4.9 g/dL    GLOBULIN, TOTAL 2.6 1.5 - 4.5 g/dL    A-G Ratio 1.6 1.2 - 2.2    Bilirubin, total 0.7 0.0 - 1.2 mg/dL    Alk. phosphatase 62 48 - 121 IU/L    AST (SGOT) 28 0 - 40 IU/L    ALT (SGPT) 36 0 - 44 IU/L   TSH 3RD GENERATION   Result Value Ref Range    TSH 2.930 0.450 - 4.500 uIU/mL   LIPID PANEL   Result Value Ref Range    Cholesterol, total 225 (H) 100 - 199 mg/dL    Triglyceride 109 0 - 149 mg/dL    HDL Cholesterol 46 >39 mg/dL    VLDL, calculated 20 5 - 40 mg/dL    LDL, calculated 159 (H) 0 - 99 mg/dL   HEMOGLOBIN A1C WITH EAG   Result Value Ref Range    Hemoglobin A1c 6.1 (H) 4.8 - 5.6 %    Estimated average glucose 128 mg/dL       Assessment/Plan:    ICD-10-CM ICD-9-CM    1. Hyperglycemia  R73.9 790.29 HEMOGLOBIN A1C WITH EAG   2. Primary insomnia  F51.01 307.42    3. Essential hypertension  I10 401.9 CBC WITH AUTOMATED DIFF      METABOLIC PANEL, COMPREHENSIVE      LIPID PANEL      TSH 3RD GENERATION   4. Benign prostatic hyperplasia, unspecified whether lower urinary tract symptoms present  N40.0 600.00 PSA W/ REFLX FREE PSA   5.  Sensory neuropathy  G62.9 356.9      Orders Placed This Encounter    PSA W/ REFLX FREE PSA    HEMOGLOBIN A1C WITH EAG    CBC WITH AUTOMATED DIFF    METABOLIC PANEL, COMPREHENSIVE    LIPID PANEL    TSH 3RD GENERATION    amLODIPine (NORVASC) 5 mg tablet     Sig: Take 1 Tablet by mouth daily for 180 days. Dispense:  30 Tablet     Refill:  5       continue present plan, routine labs ordered, call if any problems    There are no Patient Instructions on file for this visit. Follow-up and Dispositions    · Return in about 6 months (around 6/6/2022).

## 2021-12-07 LAB
ALBUMIN SERPL-MCNC: 4.1 G/DL (ref 3.8–4.9)
ALBUMIN/GLOB SERPL: 1.5 {RATIO} (ref 1.2–2.2)
ALP SERPL-CCNC: 66 IU/L (ref 44–121)
ALT SERPL-CCNC: 33 IU/L (ref 0–44)
AST SERPL-CCNC: 20 IU/L (ref 0–40)
BASOPHILS # BLD AUTO: 0 X10E3/UL (ref 0–0.2)
BASOPHILS NFR BLD AUTO: 1 %
BILIRUB SERPL-MCNC: 0.8 MG/DL (ref 0–1.2)
BUN SERPL-MCNC: 13 MG/DL (ref 6–24)
BUN/CREAT SERPL: 10 (ref 9–20)
CALCIUM SERPL-MCNC: 9 MG/DL (ref 8.7–10.2)
CHLORIDE SERPL-SCNC: 105 MMOL/L (ref 96–106)
CHOLEST SERPL-MCNC: 214 MG/DL (ref 100–199)
CO2 SERPL-SCNC: 22 MMOL/L (ref 20–29)
CREAT SERPL-MCNC: 1.36 MG/DL (ref 0.76–1.27)
EOSINOPHIL # BLD AUTO: 0.2 X10E3/UL (ref 0–0.4)
EOSINOPHIL NFR BLD AUTO: 4 %
ERYTHROCYTE [DISTWIDTH] IN BLOOD BY AUTOMATED COUNT: 12.9 % (ref 11.6–15.4)
EST. AVERAGE GLUCOSE BLD GHB EST-MCNC: 131 MG/DL
GLOBULIN SER CALC-MCNC: 2.8 G/DL (ref 1.5–4.5)
GLUCOSE SERPL-MCNC: 139 MG/DL (ref 65–99)
HBA1C MFR BLD: 6.2 % (ref 4.8–5.6)
HCT VFR BLD AUTO: 48.1 % (ref 37.5–51)
HDLC SERPL-MCNC: 41 MG/DL
HGB BLD-MCNC: 16.6 G/DL (ref 13–17.7)
IMM GRANULOCYTES # BLD AUTO: 0 X10E3/UL (ref 0–0.1)
IMM GRANULOCYTES NFR BLD AUTO: 0 %
LDLC SERPL CALC-MCNC: 153 MG/DL (ref 0–99)
LYMPHOCYTES # BLD AUTO: 1.6 X10E3/UL (ref 0.7–3.1)
LYMPHOCYTES NFR BLD AUTO: 29 %
MCH RBC QN AUTO: 31.2 PG (ref 26.6–33)
MCHC RBC AUTO-ENTMCNC: 34.5 G/DL (ref 31.5–35.7)
MCV RBC AUTO: 90 FL (ref 79–97)
MONOCYTES # BLD AUTO: 0.4 X10E3/UL (ref 0.1–0.9)
MONOCYTES NFR BLD AUTO: 8 %
NEUTROPHILS # BLD AUTO: 3.1 X10E3/UL (ref 1.4–7)
NEUTROPHILS NFR BLD AUTO: 58 %
PLATELET # BLD AUTO: 142 X10E3/UL (ref 150–450)
POTASSIUM SERPL-SCNC: 4.2 MMOL/L (ref 3.5–5.2)
PROT SERPL-MCNC: 6.9 G/DL (ref 6–8.5)
PSA SERPL-MCNC: 0.2 NG/ML (ref 0–4)
RBC # BLD AUTO: 5.32 X10E6/UL (ref 4.14–5.8)
REFLEX CRITERIA: NORMAL
SODIUM SERPL-SCNC: 139 MMOL/L (ref 134–144)
TRIGL SERPL-MCNC: 111 MG/DL (ref 0–149)
TSH SERPL DL<=0.005 MIU/L-ACNC: 2.75 UIU/ML (ref 0.45–4.5)
VLDLC SERPL CALC-MCNC: 20 MG/DL (ref 5–40)
WBC # BLD AUTO: 5.3 X10E3/UL (ref 3.4–10.8)

## 2021-12-08 NOTE — PROGRESS NOTES
Notify the patient.  Prostate test for cancer normal hemoglobin A1c about the same 6.2 stay on low-cholesterol low sugar diet no medication needed complete blood count hemoglobin normal blood sugar 139 kidney function test stable liver enzymes electrolytes potassium normal cholesterol much better was 225 down to 214 thyroid normal

## 2022-03-04 RX ORDER — AMLODIPINE BESYLATE 5 MG/1
5 TABLET ORAL DAILY
Qty: 30 TABLET | Refills: 3 | Status: SHIPPED | OUTPATIENT
Start: 2022-03-04 | End: 2022-06-30 | Stop reason: SDUPTHER

## 2022-03-18 PROBLEM — M77.10 LATERAL EPICONDYLITIS: Status: ACTIVE | Noted: 2020-11-16

## 2022-03-18 PROBLEM — I80.9 SUPERFICIAL THROMBOPHLEBITIS: Status: ACTIVE | Noted: 2020-11-16

## 2022-03-18 PROBLEM — M25.569 KNEE PAIN: Status: ACTIVE | Noted: 2020-11-16

## 2022-03-19 PROBLEM — M79.603 PAIN IN UPPER LIMB: Status: ACTIVE | Noted: 2020-11-16

## 2022-03-19 PROBLEM — J06.9 URI (UPPER RESPIRATORY INFECTION): Status: ACTIVE | Noted: 2020-11-16

## 2022-03-19 PROBLEM — R53.83 FATIGUE: Status: ACTIVE | Noted: 2020-11-16

## 2022-03-19 PROBLEM — S49.91XA INJURY OF RIGHT SHOULDER AND UPPER ARM: Status: ACTIVE | Noted: 2021-04-26

## 2022-03-19 PROBLEM — R03.0 ELEVATED BLOOD-PRESSURE READING WITHOUT DIAGNOSIS OF HYPERTENSION: Status: ACTIVE | Noted: 2020-11-16

## 2022-03-19 PROBLEM — R05.9 COUGH: Status: ACTIVE | Noted: 2020-11-16

## 2022-03-19 PROBLEM — I10 ESSENTIAL HYPERTENSION: Status: ACTIVE | Noted: 2020-11-16

## 2022-03-20 PROBLEM — I67.89 ACUTE ILL-DEFINED CEREBROVASCULAR DISEASE: Status: ACTIVE | Noted: 2020-11-16

## 2022-03-20 PROBLEM — N52.1 ERECTILE DYSFUNCTION DUE TO DISEASES CLASSIFIED ELSEWHERE: Status: ACTIVE | Noted: 2020-11-16

## 2022-03-20 PROBLEM — G47.00 INSOMNIA: Status: ACTIVE | Noted: 2020-11-16

## 2022-06-01 ENCOUNTER — OFFICE VISIT (OUTPATIENT)
Dept: INTERNAL MEDICINE CLINIC | Age: 57
End: 2022-06-01
Payer: COMMERCIAL

## 2022-06-01 VITALS
OXYGEN SATURATION: 94 % | BODY MASS INDEX: 33.53 KG/M2 | HEART RATE: 89 BPM | HEIGHT: 73 IN | RESPIRATION RATE: 12 BRPM | SYSTOLIC BLOOD PRESSURE: 122 MMHG | DIASTOLIC BLOOD PRESSURE: 78 MMHG | WEIGHT: 253 LBS

## 2022-06-01 DIAGNOSIS — Z11.59 ENCOUNTER FOR HEPATITIS C SCREENING TEST FOR LOW RISK PATIENT: ICD-10-CM

## 2022-06-01 DIAGNOSIS — I10 ESSENTIAL HYPERTENSION: Primary | ICD-10-CM

## 2022-06-01 DIAGNOSIS — E66.09 CLASS 1 OBESITY DUE TO EXCESS CALORIES WITH SERIOUS COMORBIDITY AND BODY MASS INDEX (BMI) OF 33.0 TO 33.9 IN ADULT: ICD-10-CM

## 2022-06-01 DIAGNOSIS — F17.290 CIGAR SMOKER MOTIVATED TO QUIT: ICD-10-CM

## 2022-06-01 DIAGNOSIS — E78.2 MIXED HYPERLIPIDEMIA: ICD-10-CM

## 2022-06-01 DIAGNOSIS — R73.03 PREDIABETES: ICD-10-CM

## 2022-06-01 DIAGNOSIS — Z12.5 SCREENING PSA (PROSTATE SPECIFIC ANTIGEN): ICD-10-CM

## 2022-06-01 DIAGNOSIS — N18.31 STAGE 3A CHRONIC KIDNEY DISEASE (HCC): ICD-10-CM

## 2022-06-01 PROBLEM — E66.811 CLASS 1 OBESITY DUE TO EXCESS CALORIES WITH SERIOUS COMORBIDITY AND BODY MASS INDEX (BMI) OF 33.0 TO 33.9 IN ADULT: Status: ACTIVE | Noted: 2022-06-01

## 2022-06-01 PROBLEM — I80.9 SUPERFICIAL THROMBOPHLEBITIS: Status: RESOLVED | Noted: 2020-11-16 | Resolved: 2022-06-01

## 2022-06-01 PROCEDURE — 99406 BEHAV CHNG SMOKING 3-10 MIN: CPT | Performed by: FAMILY MEDICINE

## 2022-06-01 PROCEDURE — 99214 OFFICE O/P EST MOD 30 MIN: CPT | Performed by: FAMILY MEDICINE

## 2022-06-01 NOTE — PROGRESS NOTES
Chief Complaint   Patient presents with    Follow-up     6 month      Visit Vitals  /78 (BP 1 Location: Left upper arm, BP Patient Position: Sitting, BP Cuff Size: Adult)   Pulse 89   Resp 12   Ht 6' 1\" (1.854 m)   Wt 253 lb (114.8 kg)   SpO2 94%   BMI 33.38 kg/m²     1. \"Have you been to the ER, urgent care clinic since your last visit? Hospitalized since your last visit? \" No    2. \"Have you seen or consulted any other health care providers outside of the [address] [place] [address] since your last visit? \" No     3. For patients aged 39-70: Has the patient had a colonoscopy / FIT/ Cologuard? Yes - Care Gap present. Most recent result on file      If the patient is female:    4. For patients aged 41-77: Has the patient had a mammogram within the past 2 years? NA - based on age or sex      11. For patients aged 21-65: Has the patient had a pap smear?  NA - based on age or sex

## 2022-06-01 NOTE — PROGRESS NOTES
Tapan Gama (: 1965) is a 65 y.o. male, established patient, here for evaluation of the following chief complaint(s):  Follow-up (6 month )       ASSESSMENT/PLAN:  Below is the assessment and plan developed based on review of pertinent history, physical exam, labs, studies, and medications. 1. Essential hypertension  Chronic, controlled  -     CBC WITH AUTOMATED DIFF  -     METABOLIC PANEL, COMPREHENSIVE  -     LIPID PANEL  Continue amlodipine 5 mg daily, DASH diet, moderate to vigorous aerobic exercise under 50 minutes weekly. 2. Stage 3a chronic kidney disease (HCC)  Chronic, stable  -     CBC WITH AUTOMATED DIFF  -     METABOLIC PANEL, COMPREHENSIVE  Advised to avoid NSAID use. 3. Mixed hyperlipidemia  Chronic, uncontrolled  -     LIPID PANEL  4. Prediabetes  Chronic  -     METABOLIC PANEL, COMPREHENSIVE  -     LIPID PANEL  -     HEMOGLOBIN A1C WITH EAG  5. Class 1 obesity due to excess calories with serious comorbidity and body mass index (BMI) of 33.0 to 33.9 in adult  Chronic  6. Encounter for hepatitis C screening test for low risk patient  -     HEPATITIS C AB  7. Screening PSA (prostate specific antigen)  -     PSA SCREENING (SCREENING)  8. Cigar smoker motivated to quit  Chronic  Counseled on smoking cessation for 5 minutes, patient motivated to quit. Return in about 6 months (around 2022) for chronic care follow up. SUBJECTIVE/OBJECTIVE:  HPI    Patient presenting for hypertension followup, hyperlipidemia check, obesity evaluation. Patient reports blood pressures at home most frequently normal. Patient has symptoms of none of the following; no chest pain, shortness of breath, weakness, orthostatic hypotension, cough, myalgias, rash, headaches, weight gain, leg swelling, palpitations, slow heart rate, fatigue, depression. Patient reports good compliance with medications, no side effects from medications noted.  Current treatments include diet modification, smoking cessation, Amlodipine. Patient is a  spends 4 to 5 days on the road. Unable to exercise at that time, does not feel motivated to exercise on days off. Smoking 4 black and milds daily. Family history of prostate cancer, would like PSA screened. No Known Allergies  Current Outpatient Medications   Medication Sig    amLODIPine (NORVASC) 5 mg tablet Take 1 Tablet by mouth daily for 180 days. No current facility-administered medications for this visit. Past Medical History:   Diagnosis Date    Acute ill-defined cerebrovascular disease 11/16/2020    Cough 11/16/2020    Elevated blood-pressure reading without diagnosis of hypertension 11/16/2020    Erectile dysfunction due to diseases classified elsewhere 11/16/2020    Essential hypertension 11/16/2020    Fatigue 11/16/2020    Insomnia 11/16/2020    Knee pain 11/16/2020    Lateral epicondylitis 11/16/2020    Pain in upper limb 11/16/2020    Superficial thrombophlebitis 11/16/2020    URI (upper respiratory infection) 11/16/2020     History reviewed. No pertinent surgical history. Family History   Problem Relation Age of Onset    Elevated Lipids Mother     Hypertension Mother     Hypertension Father      Social History     Tobacco Use   Smoking Status Current Some Day Smoker    Packs/day: 0.00    Years: 10.00    Pack years: 0.00    Types: Cigars   Smokeless Tobacco Never Used   Tobacco Comment    4 cigars a day         Review of Systems   All other systems reviewed and are negative. /78 (BP 1 Location: Left upper arm, BP Patient Position: Sitting, BP Cuff Size: Adult)   Pulse 89   Resp 12   Ht 6' 1\" (1.854 m)   Wt 253 lb (114.8 kg)   SpO2 94%   BMI 33.38 kg/m²    Physical Exam  Vitals reviewed. Constitutional:       Appearance: Normal appearance. HENT:      Head: Normocephalic and atraumatic. Neck:      Vascular: No carotid bruit. Cardiovascular:      Rate and Rhythm: Normal rate and regular rhythm.       Pulses: Normal pulses. Heart sounds: Normal heart sounds. Pulmonary:      Effort: Pulmonary effort is normal.      Breath sounds: Normal breath sounds. Abdominal:      General: Bowel sounds are normal.      Palpations: Abdomen is soft. Musculoskeletal:      Right lower leg: No edema. Left lower leg: No edema. Skin:     General: Skin is warm. Capillary Refill: Capillary refill takes less than 2 seconds. Neurological:      General: No focal deficit present. Mental Status: He is alert and oriented to person, place, and time. Psychiatric:         Mood and Affect: Mood normal.             On this date 06/01/2022 I have spent 30 minutes reviewing previous notes, test results and face to face with the patient discussing the diagnosis and importance of compliance with the treatment plan as well as documenting on the day of the visit. An electronic signature was used to authenticate this note.   -- Pili Jacobson MD   [facility] [id number]

## 2022-06-02 LAB
ALBUMIN SERPL-MCNC: 4.2 G/DL (ref 3.8–4.9)
ALBUMIN/GLOB SERPL: 1.6 {RATIO} (ref 1.2–2.2)
ALP SERPL-CCNC: 66 IU/L (ref 44–121)
ALT SERPL-CCNC: 31 IU/L (ref 0–44)
AST SERPL-CCNC: 18 IU/L (ref 0–40)
BASOPHILS # BLD AUTO: 0 X10E3/UL (ref 0–0.2)
BASOPHILS NFR BLD AUTO: 1 %
BILIRUB SERPL-MCNC: 0.9 MG/DL (ref 0–1.2)
BUN SERPL-MCNC: 16 MG/DL (ref 6–24)
BUN/CREAT SERPL: 12 (ref 9–20)
CALCIUM SERPL-MCNC: 9 MG/DL (ref 8.7–10.2)
CHLORIDE SERPL-SCNC: 102 MMOL/L (ref 96–106)
CHOLEST SERPL-MCNC: 221 MG/DL (ref 100–199)
CO2 SERPL-SCNC: 25 MMOL/L (ref 20–29)
CREAT SERPL-MCNC: 1.33 MG/DL (ref 0.76–1.27)
EGFR: 63 ML/MIN/1.73
EOSINOPHIL # BLD AUTO: 0.2 X10E3/UL (ref 0–0.4)
EOSINOPHIL NFR BLD AUTO: 3 %
ERYTHROCYTE [DISTWIDTH] IN BLOOD BY AUTOMATED COUNT: 13.2 % (ref 11.6–15.4)
EST. AVERAGE GLUCOSE BLD GHB EST-MCNC: 131 MG/DL
GLOBULIN SER CALC-MCNC: 2.6 G/DL (ref 1.5–4.5)
GLUCOSE SERPL-MCNC: 103 MG/DL (ref 65–99)
HBA1C MFR BLD: 6.2 % (ref 4.8–5.6)
HCT VFR BLD AUTO: 48.1 % (ref 37.5–51)
HCV AB S/CO SERPL IA: <0.1 S/CO RATIO (ref 0–0.9)
HDLC SERPL-MCNC: 47 MG/DL
HGB BLD-MCNC: 16.4 G/DL (ref 13–17.7)
IMM GRANULOCYTES # BLD AUTO: 0 X10E3/UL (ref 0–0.1)
IMM GRANULOCYTES NFR BLD AUTO: 0 %
LDLC SERPL CALC-MCNC: 147 MG/DL (ref 0–99)
LYMPHOCYTES # BLD AUTO: 1.7 X10E3/UL (ref 0.7–3.1)
LYMPHOCYTES NFR BLD AUTO: 30 %
MCH RBC QN AUTO: 31.6 PG (ref 26.6–33)
MCHC RBC AUTO-ENTMCNC: 34.1 G/DL (ref 31.5–35.7)
MCV RBC AUTO: 93 FL (ref 79–97)
MONOCYTES # BLD AUTO: 0.5 X10E3/UL (ref 0.1–0.9)
MONOCYTES NFR BLD AUTO: 8 %
NEUTROPHILS # BLD AUTO: 3.3 X10E3/UL (ref 1.4–7)
NEUTROPHILS NFR BLD AUTO: 58 %
PLATELET # BLD AUTO: 128 X10E3/UL (ref 150–450)
POTASSIUM SERPL-SCNC: 4.3 MMOL/L (ref 3.5–5.2)
PROT SERPL-MCNC: 6.8 G/DL (ref 6–8.5)
PSA SERPL-MCNC: 0.3 NG/ML (ref 0–4)
RBC # BLD AUTO: 5.19 X10E6/UL (ref 4.14–5.8)
SODIUM SERPL-SCNC: 143 MMOL/L (ref 134–144)
TRIGL SERPL-MCNC: 151 MG/DL (ref 0–149)
VLDLC SERPL CALC-MCNC: 27 MG/DL (ref 5–40)
WBC # BLD AUTO: 5.6 X10E3/UL (ref 3.4–10.8)

## 2022-06-07 DIAGNOSIS — E78.2 MIXED HYPERLIPIDEMIA: Primary | ICD-10-CM

## 2022-06-07 RX ORDER — ATORVASTATIN CALCIUM 10 MG/1
10 TABLET, FILM COATED ORAL
Qty: 90 TABLET | Refills: 1 | Status: SHIPPED | OUTPATIENT
Start: 2022-06-07

## 2022-06-09 ENCOUNTER — TELEPHONE (OUTPATIENT)
Dept: INTERNAL MEDICINE CLINIC | Age: 57
End: 2022-06-09

## 2022-06-30 ENCOUNTER — TELEPHONE (OUTPATIENT)
Dept: INTERNAL MEDICINE CLINIC | Age: 57
End: 2022-06-30

## 2022-07-01 RX ORDER — AMLODIPINE BESYLATE 5 MG/1
5 TABLET ORAL DAILY
Qty: 90 TABLET | Refills: 1 | Status: SHIPPED | OUTPATIENT
Start: 2022-07-01 | End: 2022-12-28

## 2022-12-05 ENCOUNTER — OFFICE VISIT (OUTPATIENT)
Dept: INTERNAL MEDICINE CLINIC | Age: 57
End: 2022-12-05
Payer: COMMERCIAL

## 2022-12-05 VITALS
SYSTOLIC BLOOD PRESSURE: 150 MMHG | RESPIRATION RATE: 17 BRPM | TEMPERATURE: 97.7 F | WEIGHT: 259 LBS | HEART RATE: 78 BPM | HEIGHT: 73 IN | BODY MASS INDEX: 34.33 KG/M2 | DIASTOLIC BLOOD PRESSURE: 92 MMHG

## 2022-12-05 DIAGNOSIS — E78.2 MIXED HYPERLIPIDEMIA: ICD-10-CM

## 2022-12-05 DIAGNOSIS — I10 ESSENTIAL HYPERTENSION: Primary | ICD-10-CM

## 2022-12-05 DIAGNOSIS — M77.11 LATERAL EPICONDYLITIS OF RIGHT ELBOW: ICD-10-CM

## 2022-12-05 DIAGNOSIS — N18.31 STAGE 3A CHRONIC KIDNEY DISEASE (HCC): ICD-10-CM

## 2022-12-05 DIAGNOSIS — R73.03 PREDIABETES: ICD-10-CM

## 2022-12-05 DIAGNOSIS — E66.09 CLASS 1 OBESITY DUE TO EXCESS CALORIES WITH SERIOUS COMORBIDITY AND BODY MASS INDEX (BMI) OF 34.0 TO 34.9 IN ADULT: ICD-10-CM

## 2022-12-05 DIAGNOSIS — L91.8 SKIN TAG: ICD-10-CM

## 2022-12-05 DIAGNOSIS — R55 VASOVAGAL SYNCOPE: ICD-10-CM

## 2022-12-05 PROBLEM — E66.811 CLASS 1 OBESITY DUE TO EXCESS CALORIES WITH SERIOUS COMORBIDITY AND BODY MASS INDEX (BMI) OF 34.0 TO 34.9 IN ADULT: Status: ACTIVE | Noted: 2022-06-01

## 2022-12-05 PROCEDURE — 99214 OFFICE O/P EST MOD 30 MIN: CPT | Performed by: INTERNAL MEDICINE

## 2022-12-05 PROCEDURE — 93000 ELECTROCARDIOGRAM COMPLETE: CPT | Performed by: INTERNAL MEDICINE

## 2022-12-05 RX ORDER — AMLODIPINE BESYLATE 10 MG/1
10 TABLET ORAL DAILY
Qty: 90 TABLET | Refills: 3 | Status: SHIPPED | OUTPATIENT
Start: 2022-12-05 | End: 2023-11-30

## 2022-12-05 RX ORDER — DICLOFENAC SODIUM 10 MG/G
2 GEL TOPICAL 4 TIMES DAILY
Qty: 100 G | Refills: 1 | Status: SHIPPED | OUTPATIENT
Start: 2022-12-05

## 2022-12-05 NOTE — PROGRESS NOTES
1. \"Have you been to the ER, urgent care clinic since your last visit? Hospitalized since your last visit? \" No    2. \"Have you seen or consulted any other health care providers outside of the [address] [place] [address] since your last visit? \" No     3. For patients aged 39-70: Has the patient had a colonoscopy / FIT/ Cologuard? Yes - no Care Gap present      If the patient is female:    4. For patients aged 41-77: Has the patient had a mammogram within the past 2 years? NA - based on age or sex      11. For patients aged 21-65: Has the patient had a pap smear?  NA - based on age or sex      Chief Complaint   Patient presents with    Follow Up Chronic Condition    Arm Injury        Visit Vitals  BP (!) 141/80 (BP 1 Location: Left upper arm, BP Patient Position: Sitting, BP Cuff Size: Adult)   Pulse 78   Temp 97.7 °F (36.5 °C) (Temporal)   Resp 17   Ht 6' 1\" (1.854 m)   Wt 259 lb (117.5 kg)   BMI 34.17 kg/m²

## 2022-12-05 NOTE — PROGRESS NOTES
Melony Martines is a 65 y.o. male and presents with Follow Up Chronic Condition and Arm Injury    Mr. Rob Medicine was previosuly under the care of dr. Angelica Velazquez, he saw Dr. Gabrielle Taylor in June, today is our first visit, he has HTN, HLD, stage 3a CKD, prediabetes, obesity. Most recent results from June, GFR 58, . He has been feeling well, asymptomatic from the cardiovascular stand point. Now, he told me that 3 days ago he was driving, drinking a coffee, choked a little with  it coughed and \"blacked out\". He was with company, he was able to stop the car, he says  he was out for few seconds. He  had palpitations after this happened, no nausea, no sweating, no disorientation, no chest pain. He says, last night he had some coughing after drinking fluid. , no other episodes. Denies heartburn. He's concerned about pain in the right elbow that started around 2 weeks ago, he says the pain stays in the elbow, does hot radiate, he says it was painful to make a fist or squeeze. He says     He's smoking a cigar 3 times a day    He says he's eating too much, he has been gaining weight, he was prevsiouly exercising not anymore. Concerned about a lesion in his right axilla he wants me to take a look at      Review of Systems  Review of Systems   Constitutional:  Negative for chills, fatigue, fever and unexpected weight change. HENT:  Negative for congestion, ear pain, sneezing and sore throat. Eyes:  Negative for pain and discharge. Respiratory:  Negative for cough, shortness of breath and wheezing. Cardiovascular:  Negative for chest pain, palpitations and leg swelling. Gastrointestinal:  Negative for abdominal pain, blood in stool, constipation and diarrhea. Endocrine: Negative for polydipsia and polyuria. Genitourinary:  Negative for difficulty urinating, dysuria, frequency, hematuria and urgency. Musculoskeletal:  Negative for arthralgias, back pain and joint swelling. Skin:  Negative for rash. Allergic/Immunologic: Negative for environmental allergies and food allergies. Neurological:  Negative for dizziness, speech difficulty, weakness, light-headedness, numbness and headaches. Hematological:  Negative for adenopathy. Psychiatric/Behavioral:  Negative for behavioral problems (Depression), sleep disturbance and suicidal ideas. Past Medical History:   Diagnosis Date    Acute ill-defined cerebrovascular disease 11/16/2020    Cough 11/16/2020    Elevated blood-pressure reading without diagnosis of hypertension 11/16/2020    Erectile dysfunction due to diseases classified elsewhere 11/16/2020    Essential hypertension 11/16/2020    Fatigue 11/16/2020    Insomnia 11/16/2020    Knee pain 11/16/2020    Lateral epicondylitis 11/16/2020    Pain in upper limb 11/16/2020    Superficial thrombophlebitis 11/16/2020    URI (upper respiratory infection) 11/16/2020     History reviewed. No pertinent surgical history. Social History     Socioeconomic History    Marital status:    Tobacco Use    Smoking status: Some Days     Packs/day: 0.00     Years: 10.00     Pack years: 0.00     Types: Cigars, Cigarettes    Smokeless tobacco: Never    Tobacco comments:     4 cigars a day   Vaping Use    Vaping Use: Never used   Substance and Sexual Activity    Alcohol use: Yes     Comment: occasional    Drug use: Never     Social Determinants of Health     Financial Resource Strain: Low Risk     Difficulty of Paying Living Expenses: Not hard at all   Food Insecurity: No Food Insecurity    Worried About Running Out of Food in the Last Year: Never true    Ran Out of Food in the Last Year: Never true     Family History   Problem Relation Age of Onset    Elevated Lipids Mother     Hypertension Mother     Hypertension Father      Current Outpatient Medications   Medication Sig Dispense Refill    amLODIPine (NORVASC) 10 mg tablet Take 1 Tablet by mouth daily for 360 days.  90 Tablet 3    diclofenac (VOLTAREN) 1 % gel Apply 2 g to affected area four (4) times daily. 100 g 1    atorvastatin (LIPITOR) 10 mg tablet Take 1 Tablet by mouth nightly. 90 Tablet 1     No Known Allergies    Objective:  Visit Vitals  BP (!) 150/92 (BP 1 Location: Left upper arm, BP Patient Position: Sitting, BP Cuff Size: Adult long)   Pulse 78   Temp 97.7 °F (36.5 °C) (Temporal)   Resp 17   Ht 6' 1\" (1.854 m)   Wt 259 lb (117.5 kg)   BMI 34.17 kg/m²     Physical Exam:   Physical Exam  Constitutional:       General: He is not in acute distress. Appearance: Normal appearance. He is obese. HENT:      Head: Normocephalic and atraumatic. Mouth/Throat:      Mouth: Mucous membranes are moist.   Eyes:      Extraocular Movements: Extraocular movements intact. Conjunctiva/sclera: Conjunctivae normal.      Pupils: Pupils are equal, round, and reactive to light. Cardiovascular:      Rate and Rhythm: Normal rate and regular rhythm. Pulses: Normal pulses. Heart sounds: Normal heart sounds. Pulmonary:      Effort: Pulmonary effort is normal.      Breath sounds: Normal breath sounds. Abdominal:      General: Abdomen is flat. Bowel sounds are normal. There is no distension. Palpations: Abdomen is soft. There is no mass. Tenderness: There is no abdominal tenderness. Musculoskeletal:         General: No swelling or deformity. Right elbow: Tenderness present in lateral epicondyle. Cervical back: Normal range of motion and neck supple. Right lower leg: No edema. Left lower leg: No edema. Lymphadenopathy:      Cervical: No cervical adenopathy. Skin:     General: Skin is warm and dry. Capillary Refill: Capillary refill takes less than 2 seconds. Coloration: Skin is not jaundiced or pale. Findings: No erythema or rash. Comments: Right axillary skin tag of around 5 mm   Neurological:      General: No focal deficit present. Mental Status: He is alert and oriented to person, place, and time. Psychiatric:         Mood and Affect: Mood normal.         Behavior: Behavior normal.         Thought Content: Thought content normal.         Judgment: Judgment normal.        Results for orders placed or performed in visit on 06/01/22   CBC WITH AUTOMATED DIFF   Result Value Ref Range    WBC 5.6 3.4 - 10.8 x10E3/uL    RBC 5.19 4.14 - 5.80 x10E6/uL    HGB 16.4 13.0 - 17.7 g/dL    HCT 48.1 37.5 - 51.0 %    MCV 93 79 - 97 fL    MCH 31.6 26.6 - 33.0 pg    MCHC 34.1 31.5 - 35.7 g/dL    RDW 13.2 11.6 - 15.4 %    PLATELET 073 (L) 541 - 450 x10E3/uL    NEUTROPHILS 58 Not Estab. %    Lymphocytes 30 Not Estab. %    MONOCYTES 8 Not Estab. %    EOSINOPHILS 3 Not Estab. %    BASOPHILS 1 Not Estab. %    ABS. NEUTROPHILS 3.3 1.4 - 7.0 x10E3/uL    Abs Lymphocytes 1.7 0.7 - 3.1 x10E3/uL    ABS. MONOCYTES 0.5 0.1 - 0.9 x10E3/uL    ABS. EOSINOPHILS 0.2 0.0 - 0.4 x10E3/uL    ABS. BASOPHILS 0.0 0.0 - 0.2 x10E3/uL    IMMATURE GRANULOCYTES 0 Not Estab. %    ABS. IMM. GRANS. 0.0 0.0 - 0.1 K71A6/FH   METABOLIC PANEL, COMPREHENSIVE   Result Value Ref Range    Glucose 103 (H) 65 - 99 mg/dL    BUN 16 6 - 24 mg/dL    Creatinine 1.33 (H) 0.76 - 1.27 mg/dL    eGFR 63 >59 mL/min/1.73    BUN/Creatinine ratio 12 9 - 20    Sodium 143 134 - 144 mmol/L    Potassium 4.3 3.5 - 5.2 mmol/L    Chloride 102 96 - 106 mmol/L    CO2 25 20 - 29 mmol/L    Calcium 9.0 8.7 - 10.2 mg/dL    Protein, total 6.8 6.0 - 8.5 g/dL    Albumin 4.2 3.8 - 4.9 g/dL    GLOBULIN, TOTAL 2.6 1.5 - 4.5 g/dL    A-G Ratio 1.6 1.2 - 2.2    Bilirubin, total 0.9 0.0 - 1.2 mg/dL    Alk.  phosphatase 66 44 - 121 IU/L    AST (SGOT) 18 0 - 40 IU/L    ALT (SGPT) 31 0 - 44 IU/L   LIPID PANEL   Result Value Ref Range    Cholesterol, total 221 (H) 100 - 199 mg/dL    Triglyceride 151 (H) 0 - 149 mg/dL    HDL Cholesterol 47 >39 mg/dL    VLDL, calculated 27 5 - 40 mg/dL    LDL, calculated 147 (H) 0 - 99 mg/dL   HEMOGLOBIN A1C WITH EAG   Result Value Ref Range    Hemoglobin A1c 6.2 (H) 4.8 - 5.6 % Estimated average glucose 131 mg/dL   HEPATITIS C AB   Result Value Ref Range    Hep C Virus Ab <0.1 0.0 - 0.9 s/co ratio   PSA SCREENING (SCREENING)   Result Value Ref Range    Prostate Specific Ag 0.3 0.0 - 4.0 ng/mL       Assessment/Plan:    Blood pressure is not at goal, increase amlodipine to 10 mg, counseled about lifestyle modification including smoking cessation, exercising. Concerning deep episode of \"blackout\" for a few seconds he had we did EKG within normal limits, symptoms suggestive of vasovagal syncope, gave him indications, instructed him to increase water intake, to notify me if these episodes repeat. All plan below discussed in detail with him. ICD-10-CM ICD-9-CM    1. Essential hypertension  I10 401.9 amLODIPine (NORVASC) 10 mg tablet      CBC WITH AUTOMATED DIFF      TSH 3RD GENERATION      METABOLIC PANEL, BASIC      2. Stage 3a chronic kidney disease (HCC)  R67.74 632.3 METABOLIC PANEL, BASIC      3. Prediabetes  R73.03 790.29 HEMOGLOBIN A1C WITH EAG      4. Mixed hyperlipidemia  E78.2 272.2 LIPID PANEL      5. Class 1 obesity due to excess calories with serious comorbidity and body mass index (BMI) of 34.0 to 34.9 in adult  E66.09 278.00     Z68.34 V85.34       6. Vasovagal syncope  R55 780.2 AMB POC EKG ROUTINE W/ 12 LEADS, INTER & REP      7. Lateral epicondylitis of right elbow  M77.11 726.32 diclofenac (VOLTAREN) 1 % gel      8.  Skin tag  L91.8 701.9 REFERRAL TO DERMATOLOGY        Orders Placed This Encounter    CBC WITH AUTOMATED DIFF    TSH 3RD GENERATION    LIPID PANEL    METABOLIC PANEL, BASIC    HEMOGLOBIN A1C WITH EAG    REFERRAL TO DERMATOLOGY     Referral Priority:   Routine     Referral Type:   Consultation     Referral Reason:   Specialty Services Required     Referred to Provider:   Ankush Garcia DO     Requested Specialty:   Dermatology Physician     Number of Visits Requested:   1    AMB POC EKG ROUTINE W/ 12 LEADS, INTER & REP     Order Specific Question: Reason for Exam:     Answer:   syncope    amLODIPine (NORVASC) 10 mg tablet     Sig: Take 1 Tablet by mouth daily for 360 days. Dispense:  90 Tablet     Refill:  3    diclofenac (VOLTAREN) 1 % gel     Sig: Apply 2 g to affected area four (4) times daily. Dispense:  100 g     Refill:  1       lose weight, increase physical activity, stop smoking (advice and handout given), follow low fat diet, follow low salt diet, routine labs ordered, call if any problems  There are no Patient Instructions on file for this visit. Follow-up and Dispositions    Return in about 3 months (around 3/5/2023).

## 2022-12-06 LAB
BASOPHILS # BLD AUTO: 0 X10E3/UL (ref 0–0.2)
BASOPHILS NFR BLD AUTO: 1 %
BUN SERPL-MCNC: 19 MG/DL (ref 6–24)
BUN/CREAT SERPL: 16 (ref 9–20)
CALCIUM SERPL-MCNC: 9.1 MG/DL (ref 8.7–10.2)
CHLORIDE SERPL-SCNC: 102 MMOL/L (ref 96–106)
CHOLEST SERPL-MCNC: 127 MG/DL (ref 100–199)
CO2 SERPL-SCNC: 21 MMOL/L (ref 20–29)
CREAT SERPL-MCNC: 1.18 MG/DL (ref 0.76–1.27)
EGFR: 72 ML/MIN/1.73
EOSINOPHIL # BLD AUTO: 0.2 X10E3/UL (ref 0–0.4)
EOSINOPHIL NFR BLD AUTO: 4 %
ERYTHROCYTE [DISTWIDTH] IN BLOOD BY AUTOMATED COUNT: 13.2 % (ref 11.6–15.4)
EST. AVERAGE GLUCOSE BLD GHB EST-MCNC: 131 MG/DL
GLUCOSE SERPL-MCNC: 121 MG/DL (ref 70–99)
HBA1C MFR BLD: 6.2 % (ref 4.8–5.6)
HCT VFR BLD AUTO: 49 % (ref 37.5–51)
HDLC SERPL-MCNC: 41 MG/DL
HGB BLD-MCNC: 16.4 G/DL (ref 13–17.7)
IMM GRANULOCYTES # BLD AUTO: 0 X10E3/UL (ref 0–0.1)
IMM GRANULOCYTES NFR BLD AUTO: 0 %
LDLC SERPL CALC-MCNC: 70 MG/DL (ref 0–99)
LYMPHOCYTES # BLD AUTO: 1.5 X10E3/UL (ref 0.7–3.1)
LYMPHOCYTES NFR BLD AUTO: 26 %
MCH RBC QN AUTO: 31.3 PG (ref 26.6–33)
MCHC RBC AUTO-ENTMCNC: 33.5 G/DL (ref 31.5–35.7)
MCV RBC AUTO: 94 FL (ref 79–97)
MONOCYTES # BLD AUTO: 0.4 X10E3/UL (ref 0.1–0.9)
MONOCYTES NFR BLD AUTO: 7 %
NEUTROPHILS # BLD AUTO: 3.4 X10E3/UL (ref 1.4–7)
NEUTROPHILS NFR BLD AUTO: 62 %
PLATELET # BLD AUTO: 134 X10E3/UL (ref 150–450)
POTASSIUM SERPL-SCNC: 4.3 MMOL/L (ref 3.5–5.2)
RBC # BLD AUTO: 5.24 X10E6/UL (ref 4.14–5.8)
SODIUM SERPL-SCNC: 138 MMOL/L (ref 134–144)
TRIGL SERPL-MCNC: 83 MG/DL (ref 0–149)
TSH SERPL DL<=0.005 MIU/L-ACNC: 2.11 UIU/ML (ref 0.45–4.5)
VLDLC SERPL CALC-MCNC: 16 MG/DL (ref 5–40)
WBC # BLD AUTO: 5.5 X10E3/UL (ref 3.4–10.8)

## 2022-12-08 RX ORDER — ATORVASTATIN CALCIUM 10 MG/1
10 TABLET, FILM COATED ORAL
Qty: 30 TABLET | Refills: 0 | Status: SHIPPED | OUTPATIENT
Start: 2022-12-08

## 2022-12-12 ENCOUNTER — TELEPHONE (OUTPATIENT)
Dept: PRIMARY CARE CLINIC | Age: 57
End: 2022-12-12

## 2022-12-12 NOTE — TELEPHONE ENCOUNTER
Patient called for lab results. He stated he viewed them on mychart but would like them explained. Please advise.

## 2023-01-09 ENCOUNTER — TELEPHONE (OUTPATIENT)
Dept: INTERNAL MEDICINE CLINIC | Age: 58
End: 2023-01-09

## 2023-01-09 NOTE — TELEPHONE ENCOUNTER
Patient called would like you to have Dr. Tere Mccullough call in some amoxicillin due to hot/cold temp 101 this weekend now about 97 or 98.   Pt did not do Covid test. [employer]   [phone] [facility])

## 2023-02-27 ENCOUNTER — TRANSCRIBE ORDER (OUTPATIENT)
Dept: SCHEDULING | Age: 58
End: 2023-02-27

## 2023-02-27 ENCOUNTER — HOSPITAL ENCOUNTER (OUTPATIENT)
Dept: MRI IMAGING | Age: 58
Discharge: HOME | End: 2023-02-27
Payer: COMMERCIAL

## 2023-02-27 DIAGNOSIS — M25.561 PAIN IN RIGHT KNEE: ICD-10-CM

## 2023-02-27 DIAGNOSIS — M25.461 KNEE EFFUSION, RIGHT: ICD-10-CM

## 2023-02-27 DIAGNOSIS — S76.111A QUADRICEPS MUSCLE RUPTURE, RIGHT, INITIAL ENCOUNTER: ICD-10-CM

## 2023-02-27 DIAGNOSIS — S76.111A QUADRICEPS MUSCLE RUPTURE, RIGHT, INITIAL ENCOUNTER: Primary | ICD-10-CM

## 2023-02-27 PROCEDURE — 73721 MRI JNT OF LWR EXTRE W/O DYE: CPT

## 2023-03-05 DIAGNOSIS — E78.2 MIXED HYPERLIPIDEMIA: ICD-10-CM

## 2023-03-07 RX ORDER — ATORVASTATIN CALCIUM 10 MG/1
10 TABLET, FILM COATED ORAL
Qty: 90 TABLET | Refills: 3 | Status: SHIPPED | OUTPATIENT
Start: 2023-03-07

## 2023-03-08 ENCOUNTER — TELEPHONE (OUTPATIENT)
Dept: INTERNAL MEDICINE CLINIC | Age: 58
End: 2023-03-08

## 2023-03-10 NOTE — TELEPHONE ENCOUNTER
Called and spoke to pt regarding meds. Pt was concerned that JESUS Sylvester would refill his crestor and he has never seen this provider ever. Pt says he was to get his lab work repeated prior to getting refill with Dr. Analisa Atkinson, just in case he would need to make a change. Pt says he is NOT picking up the refill. He will wait to get blood work done ans speak to Dr. Analisa Atkinson at his next visit. He has recently had to have knee surgery due to an injury.  Thank you

## 2023-05-11 ENCOUNTER — APPOINTMENT (OUTPATIENT)
Facility: HOSPITAL | Age: 58
End: 2023-05-11
Payer: COMMERCIAL

## 2023-05-11 ENCOUNTER — HOSPITAL ENCOUNTER (EMERGENCY)
Facility: HOSPITAL | Age: 58
Discharge: HOME | End: 2023-05-11
Attending: EMERGENCY MEDICINE
Payer: COMMERCIAL

## 2023-05-11 VITALS
TEMPERATURE: 98 F | DIASTOLIC BLOOD PRESSURE: 82 MMHG | SYSTOLIC BLOOD PRESSURE: 143 MMHG | HEART RATE: 77 BPM | RESPIRATION RATE: 15 BRPM | OXYGEN SATURATION: 93 %

## 2023-05-11 DIAGNOSIS — R07.9 CHEST PAIN, UNSPECIFIED TYPE: ICD-10-CM

## 2023-05-11 DIAGNOSIS — K21.00 GASTROESOPHAGEAL REFLUX DISEASE WITH ESOPHAGITIS WITHOUT HEMORRHAGE: Primary | ICD-10-CM

## 2023-05-11 LAB
ALBUMIN SERPL-MCNC: 3.3 G/DL (ref 3.5–5)
ALBUMIN/GLOB SERPL: 0.9 (ref 1.1–2.2)
ALP SERPL-CCNC: 63 U/L (ref 45–117)
ALT SERPL-CCNC: 58 U/L (ref 12–78)
ANION GAP SERPL CALC-SCNC: 2 MMOL/L (ref 5–15)
AST SERPL-CCNC: 25 U/L (ref 15–37)
BILIRUB SERPL-MCNC: 0.7 MG/DL (ref 0.2–1)
BUN SERPL-MCNC: 16 MG/DL (ref 6–20)
BUN/CREAT SERPL: 12 (ref 12–20)
CALCIUM SERPL-MCNC: 8.8 MG/DL (ref 8.5–10.1)
CHLORIDE SERPL-SCNC: 109 MMOL/L (ref 97–108)
CO2 SERPL-SCNC: 27 MMOL/L (ref 21–32)
COMMENT:: NORMAL
CREAT SERPL-MCNC: 1.29 MG/DL (ref 0.7–1.3)
EKG ATRIAL RATE: 89 BPM
EKG DIAGNOSIS: NORMAL
EKG P AXIS: 30 DEGREES
EKG P-R INTERVAL: 172 MS
EKG Q-T INTERVAL: 372 MS
EKG QRS DURATION: 82 MS
EKG QTC CALCULATION (BAZETT): 452 MS
EKG R AXIS: 1 DEGREES
EKG T AXIS: 9 DEGREES
EKG VENTRICULAR RATE: 89 BPM
ERYTHROCYTE [DISTWIDTH] IN BLOOD BY AUTOMATED COUNT: 13.5 % (ref 11.5–14.5)
GLOBULIN SER CALC-MCNC: 3.7 G/DL (ref 2–4)
GLUCOSE SERPL-MCNC: 149 MG/DL (ref 65–100)
HCT VFR BLD AUTO: 44.4 % (ref 36.6–50.3)
HGB BLD-MCNC: 14.7 G/DL (ref 12.1–17)
MCH RBC QN AUTO: 30.4 PG (ref 26–34)
MCHC RBC AUTO-ENTMCNC: 33.1 G/DL (ref 30–36.5)
MCV RBC AUTO: 91.7 FL (ref 80–99)
NRBC # BLD: 0 K/UL (ref 0–0.01)
NRBC BLD-RTO: 0 PER 100 WBC
PLATELET # BLD AUTO: 163 K/UL (ref 150–400)
PMV BLD AUTO: 11.8 FL (ref 8.9–12.9)
POTASSIUM SERPL-SCNC: 3.6 MMOL/L (ref 3.5–5.1)
PROT SERPL-MCNC: 7 G/DL (ref 6.4–8.2)
RBC # BLD AUTO: 4.84 M/UL (ref 4.1–5.7)
SODIUM SERPL-SCNC: 138 MMOL/L (ref 136–145)
SPECIMEN HOLD: NORMAL
TROPONIN I SERPL HS-MCNC: 27 NG/L (ref 0–57)
WBC # BLD AUTO: 6.9 K/UL (ref 4.1–11.1)

## 2023-05-11 PROCEDURE — 84484 ASSAY OF TROPONIN QUANT: CPT

## 2023-05-11 PROCEDURE — 99285 EMERGENCY DEPT VISIT HI MDM: CPT | Performed by: EMERGENCY MEDICINE

## 2023-05-11 PROCEDURE — 71046 X-RAY EXAM CHEST 2 VIEWS: CPT

## 2023-05-11 PROCEDURE — 93005 ELECTROCARDIOGRAM TRACING: CPT | Performed by: EMERGENCY MEDICINE

## 2023-05-11 PROCEDURE — 85027 COMPLETE CBC AUTOMATED: CPT

## 2023-05-11 PROCEDURE — 80053 COMPREHEN METABOLIC PANEL: CPT

## 2023-05-11 PROCEDURE — 36415 COLL VENOUS BLD VENIPUNCTURE: CPT

## 2023-05-11 PROCEDURE — 93010 ELECTROCARDIOGRAM REPORT: CPT | Performed by: INTERNAL MEDICINE

## 2023-05-11 PROCEDURE — 6370000000 HC RX 637 (ALT 250 FOR IP): Performed by: EMERGENCY MEDICINE

## 2023-05-11 RX ADMIN — Medication 40 ML: at 04:15

## 2023-05-11 ASSESSMENT — ENCOUNTER SYMPTOMS
COUGH: 0
NAUSEA: 0
SORE THROAT: 0
HEARTBURN: 0
SHORTNESS OF BREATH: 0
COLOR CHANGE: 0
EYE PAIN: 0
RHINORRHEA: 0
BACK PAIN: 0
ABDOMINAL PAIN: 0
VOMITING: 0
DIARRHEA: 0

## 2023-05-11 ASSESSMENT — PAIN SCALES - GENERAL: PAINLEVEL_OUTOF10: 5

## 2023-05-11 ASSESSMENT — PAIN - FUNCTIONAL ASSESSMENT: PAIN_FUNCTIONAL_ASSESSMENT: 0-10

## 2023-05-11 ASSESSMENT — PAIN DESCRIPTION - ORIENTATION: ORIENTATION: MID;UPPER

## 2023-05-11 ASSESSMENT — PAIN DESCRIPTION - LOCATION: LOCATION: CHEST

## 2023-05-11 NOTE — ED TRIAGE NOTES
He arrives with his wife for upper mid chest pain that started yesterday. He comes in now because he awoke with coughing and chest pain. He rates the pain 5/10. He denies any other symptoms.

## 2023-05-11 NOTE — ED PROVIDER NOTES
Genitourinary:  Negative for dysuria. Musculoskeletal:  Negative for arthralgias, back pain and joint swelling. Skin:  Negative for color change and rash. Neurological:  Negative for dizziness, syncope, numbness and headaches. Psychiatric/Behavioral:  Negative for agitation, behavioral problems, confusion and decreased concentration. All other systems reviewed and are negative. Except as noted above the remainder of the review of systems was reviewed and negative. PAST MEDICAL HISTORY     Past Medical History:   Diagnosis Date    Acute ill-defined cerebrovascular disease 11/16/2020    Cough 11/16/2020    Elevated blood-pressure reading without diagnosis of hypertension 11/16/2020    Erectile dysfunction due to diseases classified elsewhere 11/16/2020    Essential hypertension 11/16/2020    Fatigue 11/16/2020    Insomnia 11/16/2020    Knee pain 11/16/2020    Lateral epicondylitis 11/16/2020    Pain in upper limb 11/16/2020    Superficial thrombophlebitis 11/16/2020    URI (upper respiratory infection) 11/16/2020         SURGICAL HISTORY       Past Surgical History:   Procedure Laterality Date    KNEE ARTHROSCOPY Right          CURRENT MEDICATIONS       Previous Medications    No medications on file       ALLERGIES     Patient has no known allergies.     FAMILY HISTORY       Family History   Problem Relation Age of Onset    Elevated Lipids Mother     Hypertension Mother     Hypertension Father           SOCIAL HISTORY       Social History     Socioeconomic History    Marital status:      Spouse name: None    Number of children: None    Years of education: None    Highest education level: None   Tobacco Use    Smoking status: Some Days     Packs/day: 0.00     Types: Cigarettes    Smokeless tobacco: Never   Substance and Sexual Activity    Alcohol use: Yes    Drug use: Never     Social Determinants of Health     Financial Resource Strain: Low Risk     Difficulty of Paying Living Expenses: Not

## 2023-05-11 NOTE — ED NOTES
The patient left the Emergency Department ambulatory, alert and oriented and in no acute distress. The patient was encouraged to call or return to the ED for worsening issues or problems and was encouraged to schedule a follow up appointment for continuing care. The patient verbalized understanding of discharge instructions and all questions were answered. The patient has no further concerns at this time.          Dirk Kapoor RN  05/11/23 [id number]

## 2023-05-12 ENCOUNTER — TELEPHONE (OUTPATIENT)
Facility: CLINIC | Age: 58
End: 2023-05-12

## 2023-05-17 RX ORDER — AMLODIPINE BESYLATE 10 MG/1
10 TABLET ORAL DAILY
Qty: 30 TABLET | Refills: 11 | COMMUNITY
Start: 2022-12-05 | End: 2023-11-30

## 2023-05-17 RX ORDER — ATORVASTATIN CALCIUM 10 MG/1
1 TABLET, FILM COATED ORAL NIGHTLY
COMMUNITY
Start: 2023-03-07

## 2023-06-12 DIAGNOSIS — I10 ESSENTIAL (PRIMARY) HYPERTENSION: ICD-10-CM

## 2023-06-12 PROBLEM — N18.31 STAGE 3A CHRONIC KIDNEY DISEASE (HCC): Status: RESOLVED | Noted: 2022-06-01 | Resolved: 2023-06-12

## 2023-12-11 ENCOUNTER — OFFICE VISIT (OUTPATIENT)
Facility: CLINIC | Age: 58
End: 2023-12-11
Payer: COMMERCIAL

## 2023-12-11 VITALS
OXYGEN SATURATION: 97 % | SYSTOLIC BLOOD PRESSURE: 132 MMHG | TEMPERATURE: 98.4 F | HEART RATE: 72 BPM | HEIGHT: 73 IN | BODY MASS INDEX: 33.8 KG/M2 | WEIGHT: 255 LBS | DIASTOLIC BLOOD PRESSURE: 80 MMHG

## 2023-12-11 DIAGNOSIS — I10 ESSENTIAL (PRIMARY) HYPERTENSION: Primary | ICD-10-CM

## 2023-12-11 DIAGNOSIS — R73.03 PREDIABETES: ICD-10-CM

## 2023-12-11 DIAGNOSIS — I10 ESSENTIAL (PRIMARY) HYPERTENSION: ICD-10-CM

## 2023-12-11 DIAGNOSIS — N18.31 CHRONIC RENAL FAILURE, STAGE 3A (HCC): Primary | ICD-10-CM

## 2023-12-11 PROCEDURE — 3075F SYST BP GE 130 - 139MM HG: CPT | Performed by: INTERNAL MEDICINE

## 2023-12-11 PROCEDURE — 3078F DIAST BP <80 MM HG: CPT | Performed by: INTERNAL MEDICINE

## 2023-12-11 PROCEDURE — 99213 OFFICE O/P EST LOW 20 MIN: CPT | Performed by: INTERNAL MEDICINE

## 2023-12-11 RX ORDER — AMLODIPINE BESYLATE 10 MG/1
10 TABLET ORAL DAILY
Qty: 90 TABLET | Refills: 1 | Status: SHIPPED | OUTPATIENT
Start: 2023-12-11

## 2023-12-11 ASSESSMENT — ENCOUNTER SYMPTOMS
GASTROINTESTINAL NEGATIVE: 1
RESPIRATORY NEGATIVE: 1

## 2023-12-12 LAB
BUN SERPL-MCNC: 10 MG/DL (ref 6–24)
BUN/CREAT SERPL: 7 (ref 9–20)
CALCIUM SERPL-MCNC: 9.1 MG/DL (ref 8.7–10.2)
CHLORIDE SERPL-SCNC: 104 MMOL/L (ref 96–106)
CO2 SERPL-SCNC: 21 MMOL/L (ref 20–29)
CREAT SERPL-MCNC: 1.42 MG/DL (ref 0.76–1.27)
EGFRCR SERPLBLD CKD-EPI 2021: 58 ML/MIN/1.73
GLUCOSE SERPL-MCNC: 134 MG/DL (ref 70–99)
HBA1C MFR BLD: 6.1 % (ref 4.8–5.6)
POTASSIUM SERPL-SCNC: 4.1 MMOL/L (ref 3.5–5.2)
SODIUM SERPL-SCNC: 141 MMOL/L (ref 134–144)

## 2023-12-13 ENCOUNTER — TELEPHONE (OUTPATIENT)
Facility: CLINIC | Age: 58
End: 2023-12-13

## 2023-12-13 DIAGNOSIS — N18.31 CHRONIC RENAL FAILURE, STAGE 3A (HCC): ICD-10-CM

## 2023-12-13 NOTE — RESULT ENCOUNTER NOTE
A1C is in prediabetes range, please give him diet recommendations. There is a decrease in his kidney function which is new compared to previous result, but does not seem to be acute, likely progressing in few months. Needs to repeat kidney function before his next visit and do a kidney ultrasound, both orders placed. Placed order for urine microalbumin as well. Thanks   Copying Dr. Virgen Hair who he will see so you are aware.   Thanks

## 2024-01-08 ENCOUNTER — HOSPITAL ENCOUNTER (OUTPATIENT)
Facility: HOSPITAL | Age: 59
Discharge: HOME | End: 2024-01-11
Attending: INTERNAL MEDICINE
Payer: COMMERCIAL

## 2024-01-08 DIAGNOSIS — N18.31 CHRONIC RENAL FAILURE, STAGE 3A (HCC): ICD-10-CM

## 2024-01-08 PROCEDURE — 76770 US EXAM ABDO BACK WALL COMP: CPT

## 2024-01-15 ENCOUNTER — TELEPHONE (OUTPATIENT)
Facility: CLINIC | Age: 59
End: 2024-01-15

## 2024-01-15 NOTE — TELEPHONE ENCOUNTER
Patient called to get the result of his US. I advised it was sent to him in [facility] and the results were normal.    Patient would like to know next steps since that test was in fact normal. Is there anything else he needs to do at this time.

## 2024-01-16 NOTE — TELEPHONE ENCOUNTER
Called and spoke to pt regarding his question. I read to him his instructions per Dr. Sanderson. Thank you

## 2024-03-15 ENCOUNTER — TELEPHONE (OUTPATIENT)
Facility: CLINIC | Age: 59
End: 2024-03-15

## 2024-03-15 NOTE — TELEPHONE ENCOUNTER
Patient called and requested an antibiotic for stye in his eye. Patient reports stye has been there for about 1 week. Patient was advised he should be seen for evaluation. Patient will go to [facility] for evaluation.

## 2025-07-24 PROCEDURE — 99285 EMERGENCY DEPT VISIT HI MDM: CPT

## 2025-07-25 ENCOUNTER — HOSPITAL ENCOUNTER (EMERGENCY)
Facility: HOSPITAL | Age: 60
Discharge: HOME | End: 2025-07-25
Attending: EMERGENCY MEDICINE
Payer: COMMERCIAL

## 2025-07-25 ENCOUNTER — APPOINTMENT (OUTPATIENT)
Facility: HOSPITAL | Age: 60
End: 2025-07-25
Payer: COMMERCIAL

## 2025-07-25 VITALS
DIASTOLIC BLOOD PRESSURE: 96 MMHG | BODY MASS INDEX: 33.53 KG/M2 | OXYGEN SATURATION: 98 % | HEART RATE: 75 BPM | SYSTOLIC BLOOD PRESSURE: 162 MMHG | HEIGHT: 74 IN | TEMPERATURE: 97.8 F | RESPIRATION RATE: 18 BRPM | WEIGHT: 261.25 LBS

## 2025-07-25 DIAGNOSIS — J32.0 CHRONIC MAXILLARY SINUSITIS: Primary | ICD-10-CM

## 2025-07-25 DIAGNOSIS — H11.31 SUBCONJUNCTIVAL HEMATOMA, RIGHT: ICD-10-CM

## 2025-07-25 LAB
ALBUMIN SERPL-MCNC: 3.8 G/DL (ref 3.5–5)
ALBUMIN/GLOB SERPL: 1 (ref 1.1–2.2)
ALP SERPL-CCNC: 72 U/L (ref 45–117)
ALT SERPL-CCNC: 43 U/L (ref 12–78)
ANION GAP SERPL CALC-SCNC: 5 MMOL/L (ref 2–12)
AST SERPL-CCNC: 24 U/L (ref 15–37)
BASOPHILS # BLD: 0.04 K/UL (ref 0–0.1)
BASOPHILS NFR BLD: 0.6 % (ref 0–1)
BILIRUB SERPL-MCNC: 0.9 MG/DL (ref 0.2–1)
BUN SERPL-MCNC: 9 MG/DL (ref 6–20)
BUN/CREAT SERPL: 6 (ref 12–20)
CALCIUM SERPL-MCNC: 8.4 MG/DL (ref 8.5–10.1)
CHLORIDE SERPL-SCNC: 108 MMOL/L (ref 97–108)
CO2 SERPL-SCNC: 25 MMOL/L (ref 21–32)
CREAT SERPL-MCNC: 1.49 MG/DL (ref 0.7–1.3)
DIFFERENTIAL METHOD BLD: ABNORMAL
EOSINOPHIL # BLD: 0.31 K/UL (ref 0–0.4)
EOSINOPHIL NFR BLD: 5 % (ref 0–7)
ERYTHROCYTE [DISTWIDTH] IN BLOOD BY AUTOMATED COUNT: 13.5 % (ref 11.5–14.5)
FLUAV RNA SPEC QL NAA+PROBE: NOT DETECTED
FLUBV RNA SPEC QL NAA+PROBE: NOT DETECTED
GLOBULIN SER CALC-MCNC: 3.8 G/DL (ref 2–4)
GLUCOSE SERPL-MCNC: 142 MG/DL (ref 65–100)
HCT VFR BLD AUTO: 49.2 % (ref 36.6–50.3)
HGB BLD-MCNC: 16.8 G/DL (ref 12.1–17)
IMM GRANULOCYTES # BLD AUTO: 0.02 K/UL (ref 0–0.04)
IMM GRANULOCYTES NFR BLD AUTO: 0.3 % (ref 0–0.5)
LYMPHOCYTES # BLD: 2.01 K/UL (ref 0.8–3.5)
LYMPHOCYTES NFR BLD: 32.4 % (ref 12–49)
MCH RBC QN AUTO: 31.3 PG (ref 26–34)
MCHC RBC AUTO-ENTMCNC: 34.1 G/DL (ref 30–36.5)
MCV RBC AUTO: 91.8 FL (ref 80–99)
MONOCYTES # BLD: 0.48 K/UL (ref 0–1)
MONOCYTES NFR BLD: 7.7 % (ref 5–13)
NEUTS SEG # BLD: 3.35 K/UL (ref 1.8–8)
NEUTS SEG NFR BLD: 54 % (ref 32–75)
NRBC # BLD: 0 K/UL (ref 0–0.01)
NRBC BLD-RTO: 0 PER 100 WBC
PLATELET # BLD AUTO: 138 K/UL (ref 150–400)
PMV BLD AUTO: 12.4 FL (ref 8.9–12.9)
POTASSIUM SERPL-SCNC: 3.6 MMOL/L (ref 3.5–5.1)
PROT SERPL-MCNC: 7.6 G/DL (ref 6.4–8.2)
RBC # BLD AUTO: 5.36 M/UL (ref 4.1–5.7)
SARS-COV-2 RNA RESP QL NAA+PROBE: NOT DETECTED
SODIUM SERPL-SCNC: 138 MMOL/L (ref 136–145)
SOURCE: NORMAL
WBC # BLD AUTO: 6.2 K/UL (ref 4.1–11.1)

## 2025-07-25 PROCEDURE — 6370000000 HC RX 637 (ALT 250 FOR IP)

## 2025-07-25 PROCEDURE — 80053 COMPREHEN METABOLIC PANEL: CPT

## 2025-07-25 PROCEDURE — 6360000002 HC RX W HCPCS

## 2025-07-25 PROCEDURE — 85025 COMPLETE CBC W/AUTO DIFF WBC: CPT

## 2025-07-25 PROCEDURE — 96375 TX/PRO/DX INJ NEW DRUG ADDON: CPT

## 2025-07-25 PROCEDURE — 96374 THER/PROPH/DIAG INJ IV PUSH: CPT

## 2025-07-25 PROCEDURE — 70487 CT MAXILLOFACIAL W/DYE: CPT

## 2025-07-25 PROCEDURE — 6360000004 HC RX CONTRAST MEDICATION: Performed by: EMERGENCY MEDICINE

## 2025-07-25 PROCEDURE — 87636 SARSCOV2 & INF A&B AMP PRB: CPT

## 2025-07-25 RX ORDER — DEXAMETHASONE SODIUM PHOSPHATE 10 MG/ML
10 INJECTION, SOLUTION INTRAMUSCULAR; INTRAVENOUS ONCE
Status: COMPLETED | OUTPATIENT
Start: 2025-07-25 | End: 2025-07-25

## 2025-07-25 RX ORDER — METHYLPREDNISOLONE 4 MG/1
TABLET ORAL
Qty: 1 KIT | Refills: 0 | Status: SHIPPED | OUTPATIENT
Start: 2025-07-25

## 2025-07-25 RX ORDER — KETOROLAC TROMETHAMINE 30 MG/ML
15 INJECTION, SOLUTION INTRAMUSCULAR; INTRAVENOUS ONCE
Status: COMPLETED | OUTPATIENT
Start: 2025-07-25 | End: 2025-07-25

## 2025-07-25 RX ORDER — OXYMETAZOLINE HYDROCHLORIDE 0.05 G/100ML
2 SPRAY NASAL
Status: COMPLETED | OUTPATIENT
Start: 2025-07-25 | End: 2025-07-25

## 2025-07-25 RX ORDER — IOPAMIDOL 612 MG/ML
100 INJECTION, SOLUTION INTRAVASCULAR
Status: COMPLETED | OUTPATIENT
Start: 2025-07-25 | End: 2025-07-25

## 2025-07-25 RX ORDER — FLUTICASONE PROPIONATE 50 MCG
1 SPRAY, SUSPENSION (ML) NASAL DAILY PRN
Qty: 16 G | Refills: 3 | Status: SHIPPED | OUTPATIENT
Start: 2025-07-25

## 2025-07-25 RX ADMIN — DEXAMETHASONE SODIUM PHOSPHATE 10 MG: 10 INJECTION INTRAMUSCULAR; INTRAVENOUS at 02:13

## 2025-07-25 RX ADMIN — OXYMETAZOLINE HYDROCHLORIDE 2 SPRAY: 0.5 SPRAY NASAL at 02:13

## 2025-07-25 RX ADMIN — KETOROLAC TROMETHAMINE 15 MG: 30 INJECTION, SOLUTION INTRAMUSCULAR; INTRAVENOUS at 02:20

## 2025-07-25 RX ADMIN — IOPAMIDOL 100 ML: 612 INJECTION, SOLUTION INTRAVENOUS at 01:50

## 2025-07-25 ASSESSMENT — PAIN SCALES - GENERAL: PAINLEVEL_OUTOF10: 2

## 2025-07-25 ASSESSMENT — PAIN DESCRIPTION - LOCATION: LOCATION: NECK

## 2025-07-25 NOTE — DISCHARGE INSTRUCTIONS
As discussed today, blood work was reassuring.  CT showed chronic maxillary sinusitis.  Please take antibiotics as prescribed.  Primary care doctor and take Afrin for the next 2 days.  Please take Medrol Dosepak as prescribed.  Please follow-up with ENT regarding ongoing sinusitis.  Subconjunctival hemorrhage should subside over the next few days however if worsens, schedule follow-up with ophthalmology.

## 2025-07-25 NOTE — ED TRIAGE NOTES
Patient ambulated to triage with cc right eye irration with redness/hemorrhage since Monday. No active bleeding noted. Cough+ Headache+

## 2025-07-25 NOTE — ED PROVIDER NOTES
[facility] EMERGENCY DEPARTMENT  EMERGENCY DEPARTMENT ENCOUNTER      Pt Name: Bran Coffey  MRN: [id number]  Birthdate 1965  Date of evaluation: 7/24/2025  Provider: DELPHINE Mendes    CHIEF COMPLAINT       Chief Complaint   Patient presents with    Eye Problem         HISTORY OF PRESENT ILLNESS   (Location/Symptom, Timing/Onset, Context/Setting, Quality, Duration, Modifying Factors, Severity)  Note limiting factors.   Bran Coffey is a 60 y.o. male with history of HTN who presents to the emergency department complaining of eye irritation, congestion, runny nose, headache, sinus pressure.  Reports unable redness started in the past few days and denies any eye injuries or foreign bodies in the eye or purulent discharge.  Patient reports has been having symptoms since June 17.  Reports has been seen by primary care doctor twice weekly and prescribed a Z-Montez and amoxicillin.  Denies any relief with both antibiotics.  He reports also having bilateral hand dryness, body aches, fatigue.                     Review of External Medical Records:     Nursing Notes were reviewed.    REVIEW OF SYSTEMS    (2-9 systems for level 4, 10 or more for level 5)     Review of Systems    Except as noted above the remainder of the review of systems was reviewed and negative.       PAST MEDICAL HISTORY     Past Medical History:   Diagnosis Date    Acute ill-defined cerebrovascular disease 11/16/2020    Cough 11/16/2020    Elevated blood-pressure reading without diagnosis of hypertension 11/16/2020    Erectile dysfunction due to diseases classified elsewhere 11/16/2020    Essential hypertension 11/16/2020    Fatigue 11/16/2020    Insomnia 11/16/2020    Knee pain 11/16/2020    Lateral epicondylitis 11/16/2020    Pain in upper limb 11/16/2020    Superficial thrombophlebitis 11/16/2020    URI (upper respiratory infection) 11/16/2020         SURGICAL HISTORY       Past Surgical History:   Procedure Laterality Date    KNEE

## 2025-09-05 ENCOUNTER — HOSPITAL ENCOUNTER (OUTPATIENT)
Facility: HOSPITAL | Age: 60
Discharge: HOME | End: 2025-09-05
Payer: COMMERCIAL

## 2025-09-05 ENCOUNTER — TRANSCRIBE ORDERS (OUTPATIENT)
Dept: SCHEDULING | Age: 60
End: 2025-09-05

## 2025-09-05 DIAGNOSIS — M54.12 BRACHIAL NEURITIS: ICD-10-CM

## 2025-09-05 DIAGNOSIS — M54.12 BRACHIAL NEURITIS: Primary | ICD-10-CM

## 2025-09-05 PROCEDURE — 72141 MRI NECK SPINE W/O DYE: CPT
